# Patient Record
Sex: MALE | Race: WHITE | Employment: OTHER | URBAN - METROPOLITAN AREA
[De-identification: names, ages, dates, MRNs, and addresses within clinical notes are randomized per-mention and may not be internally consistent; named-entity substitution may affect disease eponyms.]

---

## 2017-01-02 ENCOUNTER — CARDPULM VISIT (OUTPATIENT)
Dept: CARDIAC REHAB | Facility: HOSPITAL | Age: 52
End: 2017-01-02
Attending: INTERNAL MEDICINE
Payer: COMMERCIAL

## 2017-01-02 PROCEDURE — 93798 PHYS/QHP OP CAR RHAB W/ECG: CPT

## 2017-01-04 ENCOUNTER — CARDPULM VISIT (OUTPATIENT)
Dept: CARDIAC REHAB | Facility: HOSPITAL | Age: 52
End: 2017-01-04
Attending: INTERNAL MEDICINE
Payer: COMMERCIAL

## 2017-01-04 PROCEDURE — 93798 PHYS/QHP OP CAR RHAB W/ECG: CPT

## 2017-01-06 ENCOUNTER — APPOINTMENT (OUTPATIENT)
Dept: CARDIAC REHAB | Facility: HOSPITAL | Age: 52
End: 2017-01-06
Attending: INTERNAL MEDICINE
Payer: COMMERCIAL

## 2017-01-06 PROCEDURE — 93798 PHYS/QHP OP CAR RHAB W/ECG: CPT

## 2017-01-09 ENCOUNTER — APPOINTMENT (OUTPATIENT)
Dept: CARDIAC REHAB | Facility: HOSPITAL | Age: 52
End: 2017-01-09
Attending: INTERNAL MEDICINE
Payer: COMMERCIAL

## 2017-01-11 ENCOUNTER — APPOINTMENT (OUTPATIENT)
Dept: CARDIAC REHAB | Facility: HOSPITAL | Age: 52
End: 2017-01-11
Attending: INTERNAL MEDICINE
Payer: COMMERCIAL

## 2017-01-11 PROCEDURE — 93798 PHYS/QHP OP CAR RHAB W/ECG: CPT

## 2017-01-13 ENCOUNTER — APPOINTMENT (OUTPATIENT)
Dept: CARDIAC REHAB | Facility: HOSPITAL | Age: 52
End: 2017-01-13
Attending: INTERNAL MEDICINE
Payer: COMMERCIAL

## 2017-01-13 PROCEDURE — 93798 PHYS/QHP OP CAR RHAB W/ECG: CPT

## 2017-01-16 ENCOUNTER — CARDPULM VISIT (OUTPATIENT)
Dept: CARDIAC REHAB | Facility: HOSPITAL | Age: 52
End: 2017-01-16
Attending: INTERNAL MEDICINE
Payer: COMMERCIAL

## 2017-01-16 PROCEDURE — 93798 PHYS/QHP OP CAR RHAB W/ECG: CPT

## 2017-01-18 ENCOUNTER — CARDPULM VISIT (OUTPATIENT)
Dept: CARDIAC REHAB | Facility: HOSPITAL | Age: 52
End: 2017-01-18
Attending: INTERNAL MEDICINE
Payer: COMMERCIAL

## 2017-01-18 PROCEDURE — 93798 PHYS/QHP OP CAR RHAB W/ECG: CPT

## 2017-01-20 ENCOUNTER — APPOINTMENT (OUTPATIENT)
Dept: CARDIAC REHAB | Facility: HOSPITAL | Age: 52
End: 2017-01-20
Attending: INTERNAL MEDICINE
Payer: COMMERCIAL

## 2017-01-23 ENCOUNTER — APPOINTMENT (OUTPATIENT)
Dept: CARDIAC REHAB | Facility: HOSPITAL | Age: 52
End: 2017-01-23
Attending: INTERNAL MEDICINE
Payer: COMMERCIAL

## 2017-01-25 ENCOUNTER — APPOINTMENT (OUTPATIENT)
Dept: CARDIAC REHAB | Facility: HOSPITAL | Age: 52
End: 2017-01-25
Attending: INTERNAL MEDICINE
Payer: COMMERCIAL

## 2017-01-27 ENCOUNTER — APPOINTMENT (OUTPATIENT)
Dept: CARDIAC REHAB | Facility: HOSPITAL | Age: 52
End: 2017-01-27
Attending: INTERNAL MEDICINE
Payer: COMMERCIAL

## 2017-01-30 ENCOUNTER — CARDPULM VISIT (OUTPATIENT)
Dept: CARDIAC REHAB | Facility: HOSPITAL | Age: 52
End: 2017-01-30
Attending: INTERNAL MEDICINE
Payer: COMMERCIAL

## 2017-01-30 PROCEDURE — 93798 PHYS/QHP OP CAR RHAB W/ECG: CPT

## 2017-02-01 ENCOUNTER — CARDPULM VISIT (OUTPATIENT)
Dept: CARDIAC REHAB | Facility: HOSPITAL | Age: 52
End: 2017-02-01
Attending: INTERNAL MEDICINE
Payer: COMMERCIAL

## 2017-02-01 PROCEDURE — 93798 PHYS/QHP OP CAR RHAB W/ECG: CPT

## 2017-02-03 ENCOUNTER — APPOINTMENT (OUTPATIENT)
Dept: CARDIAC REHAB | Facility: HOSPITAL | Age: 52
End: 2017-02-03
Attending: INTERNAL MEDICINE
Payer: COMMERCIAL

## 2017-02-03 PROCEDURE — 93798 PHYS/QHP OP CAR RHAB W/ECG: CPT

## 2017-02-06 ENCOUNTER — APPOINTMENT (OUTPATIENT)
Dept: CARDIAC REHAB | Facility: HOSPITAL | Age: 52
End: 2017-02-06
Attending: INTERNAL MEDICINE
Payer: COMMERCIAL

## 2017-02-08 ENCOUNTER — APPOINTMENT (OUTPATIENT)
Dept: CARDIAC REHAB | Facility: HOSPITAL | Age: 52
End: 2017-02-08
Attending: INTERNAL MEDICINE
Payer: COMMERCIAL

## 2017-02-10 ENCOUNTER — APPOINTMENT (OUTPATIENT)
Dept: CARDIAC REHAB | Facility: HOSPITAL | Age: 52
End: 2017-02-10
Attending: INTERNAL MEDICINE
Payer: COMMERCIAL

## 2017-02-13 ENCOUNTER — CARDPULM VISIT (OUTPATIENT)
Dept: CARDIAC REHAB | Facility: HOSPITAL | Age: 52
End: 2017-02-13
Attending: INTERNAL MEDICINE
Payer: COMMERCIAL

## 2017-02-13 PROCEDURE — 93798 PHYS/QHP OP CAR RHAB W/ECG: CPT

## 2017-02-14 ENCOUNTER — LAB ENCOUNTER (OUTPATIENT)
Dept: LAB | Facility: HOSPITAL | Age: 52
End: 2017-02-14
Attending: INTERNAL MEDICINE
Payer: COMMERCIAL

## 2017-02-14 ENCOUNTER — HOSPITAL ENCOUNTER (OUTPATIENT)
Dept: GENERAL RADIOLOGY | Facility: HOSPITAL | Age: 52
Discharge: HOME | End: 2017-02-14
Attending: INTERNAL MEDICINE
Payer: COMMERCIAL

## 2017-02-14 DIAGNOSIS — R05.9 COUGH: Primary | ICD-10-CM

## 2017-02-14 DIAGNOSIS — J18.9 PNEUMONIA: ICD-10-CM

## 2017-02-14 DIAGNOSIS — Z87.09 HISTORY OF INFLUENZA: ICD-10-CM

## 2017-02-14 PROCEDURE — 87798 DETECT AGENT NOS DNA AMP: CPT | Performed by: INTERNAL MEDICINE

## 2017-02-14 PROCEDURE — 87502 INFLUENZA DNA AMP PROBE: CPT | Performed by: INTERNAL MEDICINE

## 2017-02-14 PROCEDURE — 71020 XR CHEST PA + LAT CHEST (CPT=71020): CPT

## 2017-02-15 ENCOUNTER — CARDPULM VISIT (OUTPATIENT)
Dept: CARDIAC REHAB | Facility: HOSPITAL | Age: 52
End: 2017-02-15
Attending: INTERNAL MEDICINE
Payer: COMMERCIAL

## 2017-02-15 PROCEDURE — 93798 PHYS/QHP OP CAR RHAB W/ECG: CPT

## 2017-02-17 ENCOUNTER — APPOINTMENT (OUTPATIENT)
Dept: CARDIAC REHAB | Facility: HOSPITAL | Age: 52
End: 2017-02-17
Attending: INTERNAL MEDICINE
Payer: COMMERCIAL

## 2017-02-17 PROCEDURE — 93798 PHYS/QHP OP CAR RHAB W/ECG: CPT

## 2017-02-20 ENCOUNTER — CARDPULM VISIT (OUTPATIENT)
Dept: CARDIAC REHAB | Facility: HOSPITAL | Age: 52
End: 2017-02-20
Attending: INTERNAL MEDICINE
Payer: COMMERCIAL

## 2017-02-20 PROCEDURE — 93798 PHYS/QHP OP CAR RHAB W/ECG: CPT

## 2017-02-22 ENCOUNTER — CARDPULM VISIT (OUTPATIENT)
Dept: CARDIAC REHAB | Facility: HOSPITAL | Age: 52
End: 2017-02-22
Attending: INTERNAL MEDICINE
Payer: COMMERCIAL

## 2017-02-22 PROCEDURE — 93798 PHYS/QHP OP CAR RHAB W/ECG: CPT

## 2017-02-24 ENCOUNTER — CARDPULM VISIT (OUTPATIENT)
Dept: CARDIAC REHAB | Facility: HOSPITAL | Age: 52
End: 2017-02-24
Attending: INTERNAL MEDICINE
Payer: COMMERCIAL

## 2017-02-24 PROCEDURE — 93798 PHYS/QHP OP CAR RHAB W/ECG: CPT

## 2017-02-27 ENCOUNTER — CARDPULM VISIT (OUTPATIENT)
Dept: CARDIAC REHAB | Facility: HOSPITAL | Age: 52
End: 2017-02-27
Attending: INTERNAL MEDICINE
Payer: COMMERCIAL

## 2017-02-27 PROCEDURE — 93798 PHYS/QHP OP CAR RHAB W/ECG: CPT

## 2017-03-01 ENCOUNTER — CARDPULM VISIT (OUTPATIENT)
Dept: CARDIAC REHAB | Facility: HOSPITAL | Age: 52
End: 2017-03-01
Attending: INTERNAL MEDICINE
Payer: COMMERCIAL

## 2017-03-01 PROCEDURE — 93798 PHYS/QHP OP CAR RHAB W/ECG: CPT

## 2017-03-03 ENCOUNTER — CARDPULM VISIT (OUTPATIENT)
Dept: CARDIAC REHAB | Facility: HOSPITAL | Age: 52
End: 2017-03-03
Attending: INTERNAL MEDICINE
Payer: COMMERCIAL

## 2017-03-03 PROCEDURE — 93798 PHYS/QHP OP CAR RHAB W/ECG: CPT

## 2017-05-19 ENCOUNTER — HOSPITAL ENCOUNTER (OUTPATIENT)
Dept: CV DIAGNOSTICS | Facility: HOSPITAL | Age: 52
Discharge: HOME | End: 2017-05-19
Attending: INTERNAL MEDICINE
Payer: COMMERCIAL

## 2017-05-19 ENCOUNTER — APPOINTMENT (OUTPATIENT)
Dept: CV DIAGNOSTICS | Facility: HOSPITAL | Age: 52
End: 2017-05-19
Attending: EMERGENCY MEDICINE
Payer: COMMERCIAL

## 2017-05-19 ENCOUNTER — APPOINTMENT (OUTPATIENT)
Dept: GENERAL RADIOLOGY | Facility: HOSPITAL | Age: 52
End: 2017-05-19
Payer: COMMERCIAL

## 2017-05-19 ENCOUNTER — HOSPITAL ENCOUNTER (OUTPATIENT)
Dept: LAB | Facility: HOSPITAL | Age: 52
Discharge: HOME | End: 2017-05-19
Attending: INTERNAL MEDICINE
Payer: COMMERCIAL

## 2017-05-19 ENCOUNTER — HOSPITAL ENCOUNTER (EMERGENCY)
Facility: HOSPITAL | Age: 52
Discharge: HOME | End: 2017-05-19
Attending: EMERGENCY MEDICINE
Payer: COMMERCIAL

## 2017-05-19 VITALS
DIASTOLIC BLOOD PRESSURE: 84 MMHG | SYSTOLIC BLOOD PRESSURE: 151 MMHG | HEART RATE: 78 BPM | BODY MASS INDEX: 29.35 KG/M2 | RESPIRATION RATE: 18 BRPM | OXYGEN SATURATION: 96 % | HEIGHT: 67 IN | TEMPERATURE: 97 F | WEIGHT: 187 LBS

## 2017-05-19 DIAGNOSIS — R07.89 CHEST PAIN, ATYPICAL: Primary | ICD-10-CM

## 2017-05-19 PROCEDURE — 93018 CV STRESS TEST I&R ONLY: CPT | Performed by: EMERGENCY MEDICINE

## 2017-05-19 PROCEDURE — 84484 ASSAY OF TROPONIN QUANT: CPT

## 2017-05-19 PROCEDURE — 84484 ASSAY OF TROPONIN QUANT: CPT | Performed by: INTERNAL MEDICINE

## 2017-05-19 PROCEDURE — 71020 XR CHEST PA + LAT CHEST (CPT=71020): CPT

## 2017-05-19 PROCEDURE — 99285 EMERGENCY DEPT VISIT HI MDM: CPT

## 2017-05-19 PROCEDURE — 93010 ELECTROCARDIOGRAM REPORT: CPT

## 2017-05-19 PROCEDURE — 36415 COLL VENOUS BLD VENIPUNCTURE: CPT

## 2017-05-19 PROCEDURE — 85025 COMPLETE CBC W/AUTO DIFF WBC: CPT

## 2017-05-19 PROCEDURE — 36415 COLL VENOUS BLD VENIPUNCTURE: CPT | Performed by: INTERNAL MEDICINE

## 2017-05-19 PROCEDURE — 93017 CV STRESS TEST TRACING ONLY: CPT | Performed by: EMERGENCY MEDICINE

## 2017-05-19 PROCEDURE — 93350 STRESS TTE ONLY: CPT | Performed by: EMERGENCY MEDICINE

## 2017-05-19 PROCEDURE — 93005 ELECTROCARDIOGRAM TRACING: CPT

## 2017-05-19 PROCEDURE — 80053 COMPREHEN METABOLIC PANEL: CPT

## 2017-05-19 PROCEDURE — 93010 ELECTROCARDIOGRAM REPORT: CPT | Performed by: INTERNAL MEDICINE

## 2017-05-19 NOTE — ED NOTES
Pt to go for stress echo. Pt aware. They will be coming to get pt shortly. Pt awake and alert, appears comfortable and in no distress at this time.

## 2017-05-19 NOTE — ED INITIAL ASSESSMENT (HPI)
Pain to the middle of the chest yesterday. Patient said pain lasted for a few hours and then went away. Patient described pressure as \"sharp\" then. Today, patient said he still has some of the same \"sharp\" pain, but better.  Sent from his doctor's offic

## 2017-05-19 NOTE — ED PROVIDER NOTES
Patient Seen in: [facility] Emergency Department    History   Patient presents with:  Chest Pain Angina (cardiovascular)    Stated Complaint: chest pain    HPI    57-year-old male who has a history of coronary disease and has had a stent placed in O mouth 2 (two) times daily. nitroGLYCERIN 0.4 MG Sublingual SL Tab,  Place 1 tablet (0.4 mg total) under the tongue every 5 (five) minutes as needed for Chest pain. Zolpidem Tartrate 10 MG Oral Tab,  Take 10 mg by mouth nightly as needed.      aspirin 32 cyanosis or edema noted.   Full range of motion noted without tenderness  Neuro: No focal deficits noted    ED Course     Labs Reviewed   COMP METABOLIC PANEL (14) - Abnormal; Notable for the following:     Glucose 103 (*)     AST 83 (*)     All other compo within normal limits. No pleural effusions. No pneumothorax. Mild hypertrophic spurring of the thoracic spine.           The patient underwent an stress echocardiogram which is read as normal per Dr. Mirela Rivera.   Patient states is feeling better while he

## 2017-06-16 PROBLEM — E78.00 HYPERCHOLESTEROLEMIA: Status: ACTIVE | Noted: 2017-06-16

## 2017-06-16 PROBLEM — I25.10 CORONARY ARTERY DISEASE INVOLVING NATIVE CORONARY ARTERY OF NATIVE HEART WITHOUT ANGINA PECTORIS: Status: ACTIVE | Noted: 2017-06-16

## 2017-10-30 ENCOUNTER — APPOINTMENT (OUTPATIENT)
Dept: GENERAL RADIOLOGY | Facility: HOSPITAL | Age: 52
End: 2017-10-30
Attending: EMERGENCY MEDICINE
Payer: COMMERCIAL

## 2017-10-30 ENCOUNTER — HOSPITAL ENCOUNTER (EMERGENCY)
Facility: HOSPITAL | Age: 52
Discharge: HOME | End: 2017-10-30
Attending: EMERGENCY MEDICINE
Payer: COMMERCIAL

## 2017-10-30 VITALS
DIASTOLIC BLOOD PRESSURE: 91 MMHG | BODY MASS INDEX: 30.29 KG/M2 | SYSTOLIC BLOOD PRESSURE: 154 MMHG | HEIGHT: 67 IN | OXYGEN SATURATION: 96 % | WEIGHT: 193 LBS | HEART RATE: 58 BPM | RESPIRATION RATE: 16 BRPM | TEMPERATURE: 98 F

## 2017-10-30 DIAGNOSIS — I10 HYPERTENSION, UNSPECIFIED TYPE: ICD-10-CM

## 2017-10-30 DIAGNOSIS — R07.89 CHEST PAIN, ATYPICAL: Primary | ICD-10-CM

## 2017-10-30 PROCEDURE — 71010 XR CHEST AP PORTABLE  (CPT=71010): CPT | Performed by: EMERGENCY MEDICINE

## 2017-10-30 PROCEDURE — 85025 COMPLETE CBC W/AUTO DIFF WBC: CPT | Performed by: EMERGENCY MEDICINE

## 2017-10-30 PROCEDURE — 99285 EMERGENCY DEPT VISIT HI MDM: CPT

## 2017-10-30 PROCEDURE — 93005 ELECTROCARDIOGRAM TRACING: CPT

## 2017-10-30 PROCEDURE — 36415 COLL VENOUS BLD VENIPUNCTURE: CPT

## 2017-10-30 PROCEDURE — 85730 THROMBOPLASTIN TIME PARTIAL: CPT | Performed by: EMERGENCY MEDICINE

## 2017-10-30 PROCEDURE — 85610 PROTHROMBIN TIME: CPT | Performed by: EMERGENCY MEDICINE

## 2017-10-30 PROCEDURE — 83880 ASSAY OF NATRIURETIC PEPTIDE: CPT | Performed by: EMERGENCY MEDICINE

## 2017-10-30 PROCEDURE — 93010 ELECTROCARDIOGRAM REPORT: CPT

## 2017-10-30 PROCEDURE — 84484 ASSAY OF TROPONIN QUANT: CPT | Performed by: EMERGENCY MEDICINE

## 2017-10-30 PROCEDURE — 80053 COMPREHEN METABOLIC PANEL: CPT | Performed by: EMERGENCY MEDICINE

## 2017-10-30 RX ORDER — ASPIRIN 81 MG/1
324 TABLET, CHEWABLE ORAL ONCE
Status: DISCONTINUED | OUTPATIENT
Start: 2017-10-30 | End: 2017-10-30

## 2017-10-30 RX ORDER — BENAZEPRIL HYDROCHLORIDE 20 MG/1
20 TABLET ORAL DAILY
Qty: 30 TABLET | Refills: 0 | Status: SHIPPED | OUTPATIENT
Start: 2017-10-30 | End: 2017-11-29

## 2017-10-30 RX ORDER — LISINOPRIL 10 MG/1
10 TABLET ORAL ONCE
Status: DISCONTINUED | OUTPATIENT
Start: 2017-10-30 | End: 2017-10-30

## 2017-10-30 NOTE — ED INITIAL ASSESSMENT (HPI)
Pt states cp last noc with some hyn, had stents placed last year, saw pcp today and was sent to er for further work up

## 2017-10-30 NOTE — ED PROVIDER NOTES
Patient Seen in: [facility] Emergency Department    History   Patient presents with:  Chest Pain Angina (cardiovascular)    Stated Complaint: cp for awhile had stents last year, no sob    HPI    28-year-old male presents emergency room for evaluatio Katie  2008: OTHER SURGICAL HISTORY      Comment: kidney stone removed  No date: TONSILLECTOMY    No family history on file.     Smoking status: Former Smoker                                                              Packs/day: 0.00      Years: 0.0 tenderness  EXTREMITIES: No peripheral edema, no calf tenderness, dorsal pedal pulses present and equal bilaterally. SKIN: Warm, dry, intact, no rashes.   NEUROLOGIC EXAM: Tongue midline, no facial drooping, no ptosis, muscle strength +5/5 bilateral upper Marah Cordova, patient blood pressure is elevated and was recommended by cardiologist to increase Lotensin to 20 mg daily.   Patient's chest pain is atypical, has been present over 6 months and is not exertional, patient did have stress test for the same discom

## 2017-12-11 PROBLEM — I10 ESSENTIAL HYPERTENSION: Status: ACTIVE | Noted: 2017-12-11

## 2017-12-11 PROBLEM — R73.02 GLUCOSE INTOLERANCE (IMPAIRED GLUCOSE TOLERANCE): Status: ACTIVE | Noted: 2017-12-11

## 2019-05-29 ENCOUNTER — LAB ENCOUNTER (OUTPATIENT)
Dept: LAB | Facility: HOSPITAL | Age: 54
End: 2019-05-29
Attending: INTERNAL MEDICINE
Payer: COMMERCIAL

## 2019-05-29 DIAGNOSIS — E11.65 TYPE II DIABETES MELLITUS, UNCONTROLLED (HCC): Primary | ICD-10-CM

## 2019-05-29 DIAGNOSIS — Z00.00 ROUTINE GENERAL MEDICAL EXAMINATION AT A HEALTH CARE FACILITY: ICD-10-CM

## 2019-05-29 PROCEDURE — 85025 COMPLETE CBC W/AUTO DIFF WBC: CPT

## 2019-05-29 PROCEDURE — 83036 HEMOGLOBIN GLYCOSYLATED A1C: CPT

## 2019-05-29 PROCEDURE — 80053 COMPREHEN METABOLIC PANEL: CPT

## 2019-05-29 PROCEDURE — 36415 COLL VENOUS BLD VENIPUNCTURE: CPT

## 2019-05-29 PROCEDURE — 80061 LIPID PANEL: CPT

## 2019-05-29 PROCEDURE — 84443 ASSAY THYROID STIM HORMONE: CPT

## 2019-05-29 PROCEDURE — 84439 ASSAY OF FREE THYROXINE: CPT

## 2019-06-21 ENCOUNTER — LAB ENCOUNTER (OUTPATIENT)
Dept: LAB | Facility: HOSPITAL | Age: 54
End: 2019-06-21
Attending: INTERNAL MEDICINE
Payer: COMMERCIAL

## 2019-06-21 DIAGNOSIS — D72.819 LEUKOPENIA: Primary | ICD-10-CM

## 2019-06-21 PROCEDURE — 82746 ASSAY OF FOLIC ACID SERUM: CPT

## 2019-06-21 PROCEDURE — 82607 VITAMIN B-12: CPT

## 2019-06-21 PROCEDURE — 36415 COLL VENOUS BLD VENIPUNCTURE: CPT

## 2019-09-16 ENCOUNTER — LAB ENCOUNTER (OUTPATIENT)
Dept: LAB | Facility: HOSPITAL | Age: 54
End: 2019-09-16
Attending: INTERNAL MEDICINE
Payer: COMMERCIAL

## 2019-09-16 DIAGNOSIS — E55.9 VITAMIN D DEFICIENCY: Primary | ICD-10-CM

## 2019-09-16 LAB
FOLATE SERPL-MCNC: 16.2 NG/ML (ref 8.7–?)
VIT B12 SERPL-MCNC: 249 PG/ML (ref 193–986)

## 2019-09-16 PROCEDURE — 82607 VITAMIN B-12: CPT

## 2019-09-16 PROCEDURE — 82746 ASSAY OF FOLIC ACID SERUM: CPT

## 2019-09-16 PROCEDURE — 36415 COLL VENOUS BLD VENIPUNCTURE: CPT

## 2019-12-09 PROBLEM — M54.16 LUMBAR RADICULOPATHY: Status: ACTIVE | Noted: 2019-12-09
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
## 2019-12-19 PROBLEM — M51.26 HNP (HERNIATED NUCLEUS PULPOSUS), LUMBAR: Status: ACTIVE | Noted: 2019-12-19

## 2019-12-19 PROBLEM — M54.16 LEFT LUMBAR RADICULITIS: Status: ACTIVE | Noted: 2019-12-19

## 2019-12-26 ENCOUNTER — EKG ENCOUNTER (OUTPATIENT)
Dept: LAB | Facility: HOSPITAL | Age: 54
End: 2019-12-26
Attending: INTERNAL MEDICINE
Payer: COMMERCIAL

## 2019-12-26 ENCOUNTER — HOSPITAL ENCOUNTER (OUTPATIENT)
Dept: GENERAL RADIOLOGY | Facility: HOSPITAL | Age: 54
Discharge: HOME | End: 2019-12-26
Attending: INTERNAL MEDICINE
Payer: COMMERCIAL

## 2019-12-26 DIAGNOSIS — Z01.818 PRE-OP EXAM: ICD-10-CM

## 2019-12-26 DIAGNOSIS — Z01.818 PRE-OP TESTING: Primary | ICD-10-CM

## 2019-12-26 PROCEDURE — 71046 X-RAY EXAM CHEST 2 VIEWS: CPT | Performed by: INTERNAL MEDICINE

## 2019-12-26 PROCEDURE — 81003 URINALYSIS AUTO W/O SCOPE: CPT

## 2019-12-26 PROCEDURE — 85730 THROMBOPLASTIN TIME PARTIAL: CPT

## 2019-12-26 PROCEDURE — 80053 COMPREHEN METABOLIC PANEL: CPT

## 2019-12-26 PROCEDURE — 85025 COMPLETE CBC W/AUTO DIFF WBC: CPT

## 2019-12-26 PROCEDURE — 36415 COLL VENOUS BLD VENIPUNCTURE: CPT

## 2019-12-26 PROCEDURE — 93010 ELECTROCARDIOGRAM REPORT: CPT | Performed by: INTERNAL MEDICINE

## 2019-12-26 PROCEDURE — 87081 CULTURE SCREEN ONLY: CPT

## 2019-12-26 PROCEDURE — 93005 ELECTROCARDIOGRAM TRACING: CPT

## 2019-12-26 PROCEDURE — 85610 PROTHROMBIN TIME: CPT

## 2019-12-30 ENCOUNTER — LAB ENCOUNTER (OUTPATIENT)
Dept: LAB | Facility: HOSPITAL | Age: 54
End: 2019-12-30
Attending: INTERNAL MEDICINE
Payer: COMMERCIAL

## 2019-12-30 DIAGNOSIS — E53.8 LOW FOLATE: ICD-10-CM

## 2019-12-30 DIAGNOSIS — E53.8 B12 DEFICIENCY: Primary | ICD-10-CM

## 2020-01-07 ENCOUNTER — ANESTHESIA EVENT (OUTPATIENT)
Dept: SURGERY | Facility: HOSPITAL | Age: 55
End: 2020-01-07
Payer: COMMERCIAL

## 2020-01-07 NOTE — ANESTHESIA PREPROCEDURE EVALUATION
PRE-OP EVALUATION    Patient Name: Wilbert Blanc    Pre-op Diagnosis: Lumbar disc herniation [M51.26]    Procedure(s):  Lumbar 4- Lumbar 5 Microdiscectomy     Surgeon(s) and Role:     Mary Pearce MD - Primary    Pre-op vitals reviewed.         Body ma Endo/Other      (+) diabetes  type 2, not using insulin                         Pulmonary    Negative pulmonary ROS.                        Neuro/Psych                                    Past Surgical History:   Procedure Laterality Date findings            ASA: 3   Plan: general  NPO status verified and     Post-procedure pain management plan discussed with surgeon and patient.     Comment: Discussed risks and benefits of general anesthesia including malignant hyperthermia as well as injur

## 2020-01-08 ENCOUNTER — APPOINTMENT (OUTPATIENT)
Dept: GENERAL RADIOLOGY | Facility: HOSPITAL | Age: 55
End: 2020-01-08
Attending: ORTHOPAEDIC SURGERY
Payer: COMMERCIAL

## 2020-01-08 ENCOUNTER — HOSPITAL ENCOUNTER (OUTPATIENT)
Facility: HOSPITAL | Age: 55
Setting detail: HOSPITAL OUTPATIENT SURGERY
Discharge: HOME | End: 2020-01-08
Attending: ORTHOPAEDIC SURGERY | Admitting: ORTHOPAEDIC SURGERY
Payer: COMMERCIAL

## 2020-01-08 ENCOUNTER — ANESTHESIA (OUTPATIENT)
Dept: SURGERY | Facility: HOSPITAL | Age: 55
End: 2020-01-08
Payer: COMMERCIAL

## 2020-01-08 VITALS
HEIGHT: 67 IN | BODY MASS INDEX: 26.37 KG/M2 | HEART RATE: 95 BPM | SYSTOLIC BLOOD PRESSURE: 144 MMHG | WEIGHT: 168 LBS | TEMPERATURE: 98 F | RESPIRATION RATE: 16 BRPM | OXYGEN SATURATION: 93 % | DIASTOLIC BLOOD PRESSURE: 96 MMHG

## 2020-01-08 DIAGNOSIS — M51.26 LUMBAR DISC HERNIATION: ICD-10-CM

## 2020-01-08 LAB
GLUCOSE BLD-MCNC: 136 MG/DL (ref 70–99)
GLUCOSE BLD-MCNC: 161 MG/DL (ref 70–99)

## 2020-01-08 PROCEDURE — 76000 FLUOROSCOPY <1 HR PHYS/QHP: CPT | Performed by: ORTHOPAEDIC SURGERY

## 2020-01-08 PROCEDURE — 82962 GLUCOSE BLOOD TEST: CPT

## 2020-01-08 PROCEDURE — 0SB20ZZ EXCISION OF LUMBAR VERTEBRAL DISC, OPEN APPROACH: ICD-10-PCS | Performed by: ORTHOPAEDIC SURGERY

## 2020-01-08 RX ORDER — SODIUM CHLORIDE, SODIUM LACTATE, POTASSIUM CHLORIDE, CALCIUM CHLORIDE 600; 310; 30; 20 MG/100ML; MG/100ML; MG/100ML; MG/100ML
INJECTION, SOLUTION INTRAVENOUS CONTINUOUS
Status: DISCONTINUED | OUTPATIENT
Start: 2020-01-08 | End: 2020-01-08

## 2020-01-08 RX ORDER — HYDROCODONE BITARTRATE AND ACETAMINOPHEN 5; 325 MG/1; MG/1
1 TABLET ORAL EVERY 6 HOURS PRN
Qty: 30 TABLET | Refills: 0 | Status: SHIPPED | OUTPATIENT
Start: 2020-01-08 | End: 2020-01-16

## 2020-01-08 RX ORDER — DEXTROSE MONOHYDRATE 25 G/50ML
50 INJECTION, SOLUTION INTRAVENOUS
Status: DISCONTINUED | OUTPATIENT
Start: 2020-01-08 | End: 2020-01-08 | Stop reason: HOSPADM

## 2020-01-08 RX ORDER — ONDANSETRON 2 MG/ML
INJECTION INTRAMUSCULAR; INTRAVENOUS AS NEEDED
Status: DISCONTINUED | OUTPATIENT
Start: 2020-01-08 | End: 2020-01-08 | Stop reason: SURG

## 2020-01-08 RX ORDER — ACETAMINOPHEN 500 MG
1000 TABLET ORAL ONCE
Status: DISCONTINUED | OUTPATIENT
Start: 2020-01-08 | End: 2020-01-08 | Stop reason: HOSPADM

## 2020-01-08 RX ORDER — LIDOCAINE HYDROCHLORIDE 10 MG/ML
INJECTION, SOLUTION EPIDURAL; INFILTRATION; INTRACAUDAL; PERINEURAL AS NEEDED
Status: DISCONTINUED | OUTPATIENT
Start: 2020-01-08 | End: 2020-01-08 | Stop reason: SURG

## 2020-01-08 RX ORDER — ONDANSETRON 2 MG/ML
4 INJECTION INTRAMUSCULAR; INTRAVENOUS AS NEEDED
Status: DISCONTINUED | OUTPATIENT
Start: 2020-01-08 | End: 2020-01-08

## 2020-01-08 RX ORDER — HYDROCODONE BITARTRATE AND ACETAMINOPHEN 5; 325 MG/1; MG/1
1 TABLET ORAL AS NEEDED
Status: COMPLETED | OUTPATIENT
Start: 2020-01-08 | End: 2020-01-08

## 2020-01-08 RX ORDER — LABETALOL HYDROCHLORIDE 5 MG/ML
5 INJECTION, SOLUTION INTRAVENOUS EVERY 5 MIN PRN
Status: DISCONTINUED | OUTPATIENT
Start: 2020-01-08 | End: 2020-01-08

## 2020-01-08 RX ORDER — HYDROMORPHONE HYDROCHLORIDE 1 MG/ML
0.4 INJECTION, SOLUTION INTRAMUSCULAR; INTRAVENOUS; SUBCUTANEOUS EVERY 5 MIN PRN
Status: DISCONTINUED | OUTPATIENT
Start: 2020-01-08 | End: 2020-01-08

## 2020-01-08 RX ORDER — DEXTROSE MONOHYDRATE 25 G/50ML
50 INJECTION, SOLUTION INTRAVENOUS
Status: DISCONTINUED | OUTPATIENT
Start: 2020-01-08 | End: 2020-01-08

## 2020-01-08 RX ORDER — DEXAMETHASONE SODIUM PHOSPHATE 4 MG/ML
VIAL (ML) INJECTION AS NEEDED
Status: DISCONTINUED | OUTPATIENT
Start: 2020-01-08 | End: 2020-01-08 | Stop reason: SURG

## 2020-01-08 RX ORDER — METHYLPREDNISOLONE ACETATE 40 MG/ML
INJECTION, SUSPENSION INTRA-ARTICULAR; INTRALESIONAL; INTRAMUSCULAR; SOFT TISSUE AS NEEDED
Status: DISCONTINUED | OUTPATIENT
Start: 2020-01-08 | End: 2020-01-08 | Stop reason: HOSPADM

## 2020-01-08 RX ORDER — VANCOMYCIN HYDROCHLORIDE 1 G/20ML
INJECTION, POWDER, LYOPHILIZED, FOR SOLUTION INTRAVENOUS AS NEEDED
Status: DISCONTINUED | OUTPATIENT
Start: 2020-01-08 | End: 2020-01-08 | Stop reason: HOSPADM

## 2020-01-08 RX ORDER — HYDROCODONE BITARTRATE AND ACETAMINOPHEN 5; 325 MG/1; MG/1
2 TABLET ORAL AS NEEDED
Status: COMPLETED | OUTPATIENT
Start: 2020-01-08 | End: 2020-01-08

## 2020-01-08 RX ORDER — METOCLOPRAMIDE HYDROCHLORIDE 5 MG/ML
INJECTION INTRAMUSCULAR; INTRAVENOUS AS NEEDED
Status: DISCONTINUED | OUTPATIENT
Start: 2020-01-08 | End: 2020-01-08 | Stop reason: SURG

## 2020-01-08 RX ORDER — MEPERIDINE HYDROCHLORIDE 25 MG/ML
12.5 INJECTION INTRAMUSCULAR; INTRAVENOUS; SUBCUTANEOUS AS NEEDED
Status: DISCONTINUED | OUTPATIENT
Start: 2020-01-08 | End: 2020-01-08

## 2020-01-08 RX ORDER — NALOXONE HYDROCHLORIDE 0.4 MG/ML
80 INJECTION, SOLUTION INTRAMUSCULAR; INTRAVENOUS; SUBCUTANEOUS AS NEEDED
Status: DISCONTINUED | OUTPATIENT
Start: 2020-01-08 | End: 2020-01-08

## 2020-01-08 RX ORDER — GLYCOPYRROLATE 0.2 MG/ML
INJECTION, SOLUTION INTRAMUSCULAR; INTRAVENOUS AS NEEDED
Status: DISCONTINUED | OUTPATIENT
Start: 2020-01-08 | End: 2020-01-08 | Stop reason: SURG

## 2020-01-08 RX ORDER — LABETALOL HYDROCHLORIDE 5 MG/ML
INJECTION, SOLUTION INTRAVENOUS
Status: DISCONTINUED
Start: 2020-01-08 | End: 2020-01-08

## 2020-01-08 RX ORDER — ROCURONIUM BROMIDE 10 MG/ML
INJECTION, SOLUTION INTRAVENOUS AS NEEDED
Status: DISCONTINUED | OUTPATIENT
Start: 2020-01-08 | End: 2020-01-08 | Stop reason: SURG

## 2020-01-08 RX ORDER — LABETALOL HYDROCHLORIDE 5 MG/ML
5 INJECTION, SOLUTION INTRAVENOUS ONCE
Status: COMPLETED | OUTPATIENT
Start: 2020-01-08 | End: 2020-01-08

## 2020-01-08 RX ORDER — MIDAZOLAM HYDROCHLORIDE 1 MG/ML
1 INJECTION INTRAMUSCULAR; INTRAVENOUS EVERY 5 MIN PRN
Status: DISCONTINUED | OUTPATIENT
Start: 2020-01-08 | End: 2020-01-08

## 2020-01-08 RX ORDER — NEOSTIGMINE METHYLSULFATE 1 MG/ML
INJECTION INTRAVENOUS AS NEEDED
Status: DISCONTINUED | OUTPATIENT
Start: 2020-01-08 | End: 2020-01-08 | Stop reason: SURG

## 2020-01-08 RX ORDER — INSULIN ASPART 100 [IU]/ML
INJECTION, SOLUTION INTRAVENOUS; SUBCUTANEOUS ONCE
Status: DISCONTINUED | OUTPATIENT
Start: 2020-01-08 | End: 2020-01-08

## 2020-01-08 RX ORDER — CEFAZOLIN SODIUM/WATER 2 G/20 ML
2 SYRINGE (ML) INTRAVENOUS ONCE
Status: COMPLETED | OUTPATIENT
Start: 2020-01-08 | End: 2020-01-08

## 2020-01-08 RX ORDER — BUPIVACAINE HYDROCHLORIDE 5 MG/ML
INJECTION, SOLUTION EPIDURAL; INTRACAUDAL AS NEEDED
Status: DISCONTINUED | OUTPATIENT
Start: 2020-01-08 | End: 2020-01-08 | Stop reason: HOSPADM

## 2020-01-08 RX ADMIN — GLYCOPYRROLATE 0.4 MG: 0.2 INJECTION, SOLUTION INTRAMUSCULAR; INTRAVENOUS at 08:39:00

## 2020-01-08 RX ADMIN — METOCLOPRAMIDE HYDROCHLORIDE 10 MG: 5 INJECTION INTRAMUSCULAR; INTRAVENOUS at 07:42:00

## 2020-01-08 RX ADMIN — LIDOCAINE HYDROCHLORIDE 50 MG: 10 INJECTION, SOLUTION EPIDURAL; INFILTRATION; INTRACAUDAL; PERINEURAL at 07:37:00

## 2020-01-08 RX ADMIN — NEOSTIGMINE METHYLSULFATE 4 MG: 1 INJECTION INTRAVENOUS at 08:39:00

## 2020-01-08 RX ADMIN — DEXAMETHASONE SODIUM PHOSPHATE 4 MG: 4 MG/ML VIAL (ML) INJECTION at 07:42:00

## 2020-01-08 RX ADMIN — ROCURONIUM BROMIDE 50 MG: 10 INJECTION, SOLUTION INTRAVENOUS at 07:37:00

## 2020-01-08 RX ADMIN — CEFAZOLIN SODIUM/WATER 2 G: 2 G/20 ML SYRINGE (ML) INTRAVENOUS at 07:44:00

## 2020-01-08 RX ADMIN — ONDANSETRON 4 MG: 2 INJECTION INTRAMUSCULAR; INTRAVENOUS at 08:37:00

## 2020-01-08 NOTE — ANESTHESIA PROCEDURE NOTES
Airway  Urgency: elective      General Information and Staff    Patient location during procedure: OR  Anesthesiologist: Destiny Almazan MD  Resident/CRNA: Mathew Villar CRNA  Performed: CRNA     Indications and Patient Condition  Indications for airway manag

## 2020-01-08 NOTE — OPERATIVE REPORT
Operative Report  Patient Name:  Wilbert Blanc  Date: 1/8/2020  Preoperative Diagnosis: L4-05 L disc hernation  Postoperative Diagnosis: Same  Primary Surgeon: Christiano Thakur MD  Assistant: Shannan Potts  Procedures:   L4-5 removal of disc herniation 18-gauge needle was placed in the area of interest and a lateral fluoroscopic image was taken demonstrating appropriate placement for skin incision.   Skin incision was made and electrocautery was used to dissect through the subcutaneous tissue f A physician assistant was necessary during the case to provide retraction and to manage wound suction so that I could operate with two hands under the surgical microscope.      Carrie Puentes MD  Division of Spine Surgery  [facility]

## 2020-01-08 NOTE — BRIEF OP NOTE
Pre-Operative Diagnosis: Lumbar disc herniation [M51.26]     Post-Operative Diagnosis: Lumbar disc herniation [M51.26]      Procedure Performed:   Procedure(s):  Lumbar 4- Lumbar 5 Microdiscectomy     Surgeon(s) and Role:     Marietta Boucher MD - Primary

## 2020-01-08 NOTE — H&P
Patient: Wilfred Lemus  Medical Record Number: [id number]  Referring Physician:  No ref.  provider found  PCP: Carolina Alvares MD      HISTORY OF CHIEF COMPLAINT:    Wilfred Lemus is a 62year old male, who presents today for his scheduled surgery by

## 2020-01-08 NOTE — OR PREOP
B/P 169/104, repeat 159/106 pt did not take his morning or evening BP medications Benazepril 20mg and Metoprolol 25mg. Guadalupe Riedel HR 74  called and informed of this information.  Order received to give labetalol

## 2020-01-08 NOTE — OR PREOP
bp 156/109 hr 90 before labetalol 5mg ivp given bp 116/103 hr 81 post. No further orders from dr Kvng Samayoa

## 2020-01-27 ENCOUNTER — APPOINTMENT (OUTPATIENT)
Dept: LAB | Facility: HOSPITAL | Age: 55
End: 2020-01-27
Attending: INTERNAL MEDICINE
Payer: COMMERCIAL

## 2020-01-27 DIAGNOSIS — E53.8 LOW FOLATE: ICD-10-CM

## 2020-01-27 DIAGNOSIS — E53.8 B12 DEFICIENCY: ICD-10-CM

## 2020-01-27 LAB
FOLATE SERPL-MCNC: 17.9 NG/ML (ref 8.7–?)
VIT B12 SERPL-MCNC: 1319 PG/ML (ref 193–986)

## 2020-01-27 PROCEDURE — 82607 VITAMIN B-12: CPT

## 2020-01-27 PROCEDURE — 82746 ASSAY OF FOLIC ACID SERUM: CPT

## 2020-01-27 PROCEDURE — 36415 COLL VENOUS BLD VENIPUNCTURE: CPT

## 2020-08-28 ENCOUNTER — LAB ENCOUNTER (OUTPATIENT)
Dept: LAB | Age: 55
End: 2020-08-28
Attending: INTERNAL MEDICINE
Payer: COMMERCIAL

## 2020-08-28 DIAGNOSIS — Z00.00 ROUTINE GENERAL MEDICAL EXAMINATION AT A HEALTH CARE FACILITY: Primary | ICD-10-CM

## 2020-08-28 LAB
ALBUMIN SERPL-MCNC: 4 G/DL (ref 3.4–5)
ALBUMIN/GLOB SERPL: 1.1 {RATIO} (ref 1–2)
ALP LIVER SERPL-CCNC: 90 U/L (ref 45–117)
ALT SERPL-CCNC: 90 U/L (ref 16–61)
ANION GAP SERPL CALC-SCNC: 6 MMOL/L (ref 0–18)
AST SERPL-CCNC: 98 U/L (ref 15–37)
BASOPHILS # BLD AUTO: 0.03 X10(3) UL (ref 0–0.2)
BASOPHILS NFR BLD AUTO: 0.6 %
BILIRUB SERPL-MCNC: 0.6 MG/DL (ref 0.1–2)
BUN BLD-MCNC: 19 MG/DL (ref 7–18)
BUN/CREAT SERPL: 22.9 (ref 10–20)
CALCIUM BLD-MCNC: 9.1 MG/DL (ref 8.5–10.1)
CHLORIDE SERPL-SCNC: 105 MMOL/L (ref 98–112)
CO2 SERPL-SCNC: 27 MMOL/L (ref 21–32)
COMPLEXED PSA SERPL-MCNC: 0.27 NG/ML (ref ?–4)
CREAT BLD-MCNC: 0.83 MG/DL (ref 0.7–1.3)
DEPRECATED RDW RBC AUTO: 45.9 FL (ref 35.1–46.3)
EOSINOPHIL # BLD AUTO: 0.16 X10(3) UL (ref 0–0.7)
EOSINOPHIL NFR BLD AUTO: 3.4 %
ERYTHROCYTE [DISTWIDTH] IN BLOOD BY AUTOMATED COUNT: 13.1 % (ref 11–15)
EST. AVERAGE GLUCOSE BLD GHB EST-MCNC: 131 MG/DL (ref 68–126)
GLOBULIN PLAS-MCNC: 3.7 G/DL (ref 2.8–4.4)
GLUCOSE BLD-MCNC: 145 MG/DL (ref 70–99)
HBA1C MFR BLD HPLC: 6.2 % (ref ?–5.7)
HCT VFR BLD AUTO: 46.8 % (ref 39–53)
HGB BLD-MCNC: 15.4 G/DL (ref 13–17.5)
IMM GRANULOCYTES # BLD AUTO: 0.01 X10(3) UL (ref 0–1)
IMM GRANULOCYTES NFR BLD: 0.2 %
LYMPHOCYTES # BLD AUTO: 0.99 X10(3) UL (ref 1–4)
LYMPHOCYTES NFR BLD AUTO: 21.1 %
M PROTEIN MFR SERPL ELPH: 7.7 G/DL (ref 6.4–8.2)
MCH RBC QN AUTO: 31.3 PG (ref 26–34)
MCHC RBC AUTO-ENTMCNC: 32.9 G/DL (ref 31–37)
MCV RBC AUTO: 95.1 FL (ref 80–100)
MONOCYTES # BLD AUTO: 0.51 X10(3) UL (ref 0.1–1)
MONOCYTES NFR BLD AUTO: 10.9 %
NEUTROPHILS # BLD AUTO: 3 X10 (3) UL (ref 1.5–7.7)
NEUTROPHILS # BLD AUTO: 3 X10(3) UL (ref 1.5–7.7)
NEUTROPHILS NFR BLD AUTO: 63.8 %
OSMOLALITY SERPL CALC.SUM OF ELEC: 291 MOSM/KG (ref 275–295)
PATIENT FASTING Y/N/NP: YES
PLATELET # BLD AUTO: 141 10(3)UL (ref 150–450)
POTASSIUM SERPL-SCNC: 4 MMOL/L (ref 3.5–5.1)
RBC # BLD AUTO: 4.92 X10(6)UL (ref 4.3–5.7)
SODIUM SERPL-SCNC: 138 MMOL/L (ref 136–145)
T4 FREE SERPL-MCNC: 0.9 NG/DL (ref 0.8–1.7)
TSI SER-ACNC: 2.52 MIU/ML (ref 0.36–3.74)
WBC # BLD AUTO: 4.7 X10(3) UL (ref 4–11)

## 2020-08-28 PROCEDURE — 85025 COMPLETE CBC W/AUTO DIFF WBC: CPT

## 2020-08-28 PROCEDURE — 83036 HEMOGLOBIN GLYCOSYLATED A1C: CPT

## 2020-08-28 PROCEDURE — 84443 ASSAY THYROID STIM HORMONE: CPT

## 2020-08-28 PROCEDURE — 80053 COMPREHEN METABOLIC PANEL: CPT

## 2020-08-28 PROCEDURE — 84439 ASSAY OF FREE THYROXINE: CPT

## 2020-08-28 PROCEDURE — 36415 COLL VENOUS BLD VENIPUNCTURE: CPT

## 2021-03-20 DIAGNOSIS — Z23 NEED FOR VACCINATION: ICD-10-CM

## 2021-03-21 ENCOUNTER — IMMUNIZATION (OUTPATIENT)
Dept: LAB | Age: 56
End: 2021-03-21
Attending: HOSPITALIST
Payer: COMMERCIAL

## 2021-03-21 DIAGNOSIS — Z23 NEED FOR VACCINATION: Primary | ICD-10-CM

## 2021-03-21 PROCEDURE — 0001A SARSCOV2 VAC 30MCG/0.3ML IM: CPT

## 2021-03-26 ENCOUNTER — HOSPITAL ENCOUNTER (EMERGENCY)
Facility: HOSPITAL | Age: 56
Discharge: HOME | End: 2021-03-26
Attending: EMERGENCY MEDICINE
Payer: COMMERCIAL

## 2021-03-26 ENCOUNTER — APPOINTMENT (OUTPATIENT)
Dept: GENERAL RADIOLOGY | Facility: HOSPITAL | Age: 56
End: 2021-03-26
Payer: COMMERCIAL

## 2021-03-26 VITALS
SYSTOLIC BLOOD PRESSURE: 124 MMHG | TEMPERATURE: 97 F | HEIGHT: 67 IN | RESPIRATION RATE: 18 BRPM | BODY MASS INDEX: 29.03 KG/M2 | DIASTOLIC BLOOD PRESSURE: 69 MMHG | WEIGHT: 185 LBS | HEART RATE: 81 BPM | OXYGEN SATURATION: 95 %

## 2021-03-26 DIAGNOSIS — J32.9 CHRONIC SINUSITIS, UNSPECIFIED LOCATION: Primary | ICD-10-CM

## 2021-03-26 LAB — SARS-COV-2 RNA RESP QL NAA+PROBE: NOT DETECTED

## 2021-03-26 PROCEDURE — 71045 X-RAY EXAM CHEST 1 VIEW: CPT

## 2021-03-26 PROCEDURE — 99283 EMERGENCY DEPT VISIT LOW MDM: CPT

## 2021-03-26 PROCEDURE — 99284 EMERGENCY DEPT VISIT MOD MDM: CPT

## 2021-03-26 PROCEDURE — 87081 CULTURE SCREEN ONLY: CPT | Performed by: EMERGENCY MEDICINE

## 2021-03-26 PROCEDURE — 87430 STREP A AG IA: CPT | Performed by: EMERGENCY MEDICINE

## 2021-03-26 RX ORDER — CLINDAMYCIN HYDROCHLORIDE 300 MG/1
300 CAPSULE ORAL 4 TIMES DAILY
Qty: 30 CAPSULE | Refills: 0 | Status: SHIPPED | OUTPATIENT
Start: 2021-03-26 | End: 2021-04-05

## 2021-03-26 RX ORDER — EMPAGLIFLOZIN 25 MG/1
25 TABLET, FILM COATED ORAL DAILY
COMMUNITY

## 2021-03-26 RX ORDER — GUAIFENESIN 600 MG
1200 TABLET, EXTENDED RELEASE 12 HR ORAL 2 TIMES DAILY
Qty: 20 TABLET | Refills: 0 | Status: SHIPPED | OUTPATIENT
Start: 2021-03-26 | End: 2021-03-31

## 2021-03-26 NOTE — ED INITIAL ASSESSMENT (HPI)
To the ED with c/o nasal congestion x 3 weeks. Seen last week at [facility] and given Augmentin for sinus infection- on last day of 7 day course. 2 days ago began with cough- green mucous production. Mild sore throat. No fever. Breathing non labored.

## 2021-03-26 NOTE — ED PROVIDER NOTES
Patient Seen in: [facility] Emergency Department      History   Patient presents with:  Cough/URI    Stated Complaint: nasal congestion x 3 weeks, being treated for sinus infection without relief    HPI/Subjective:   HPI    The patient is a 56-year- stent circumflex   • APPENDECTOMY     • COLONOSCOPY  4/7/16= Diverticulosis    Repeat 2026   • COLONOSCOPY, POSSIBLE BIOPSY, POSSIBLE POLYPECTOMY 06081 N/A 4/7/2016    Performed by Ami Viramontes MD at [address] [place] [address] sounds a he has a stuffy nose. Otherwise normal phonation. There are dried mucosal membranes along his nostrils and turbinates. No stridor, trismus or drooling. No significant sinus, external ear, or mastoid tenderness or swelling. No lymphadenopathy. COMPARISON:  [facility] , XR, XR CHEST PA + LAT CHEST (CPT=71046), 12/26/2019,     1:29 PM.  [facility] , XR, XR CHEST PA + LAT CHEST (CPT=71020), 5/19/2017,     2:58 PM.         INDICATIONS:  nasal congestion x 3 weeks, being treated for sinus     infection w Refills: 0

## 2021-04-08 PROBLEM — R09.81 NASAL CONGESTION: Status: ACTIVE | Noted: 2021-04-08

## 2021-04-08 PROBLEM — J32.4 CHRONIC PANSINUSITIS: Status: ACTIVE | Noted: 2021-04-08

## 2021-04-11 ENCOUNTER — IMMUNIZATION (OUTPATIENT)
Dept: LAB | Age: 56
End: 2021-04-11
Attending: HOSPITALIST
Payer: COMMERCIAL

## 2021-04-11 DIAGNOSIS — Z23 NEED FOR VACCINATION: Primary | ICD-10-CM

## 2021-04-11 PROCEDURE — 0002A SARSCOV2 VAC 30MCG/0.3ML IM: CPT

## 2021-08-11 ENCOUNTER — LAB ENCOUNTER (OUTPATIENT)
Dept: LAB | Facility: HOSPITAL | Age: 56
End: 2021-08-11
Attending: INTERNAL MEDICINE
Payer: COMMERCIAL

## 2021-08-11 DIAGNOSIS — Z00.00 ANNUAL PHYSICAL EXAM: Primary | ICD-10-CM

## 2021-08-11 DIAGNOSIS — R73.03 PRE-DIABETES: ICD-10-CM

## 2021-08-11 LAB
ALBUMIN SERPL-MCNC: 4.1 G/DL (ref 3.4–5)
ALBUMIN/GLOB SERPL: 1.2 {RATIO} (ref 1–2)
ALP LIVER SERPL-CCNC: 96 U/L
ALT SERPL-CCNC: 70 U/L
ANION GAP SERPL CALC-SCNC: 6 MMOL/L (ref 0–18)
AST SERPL-CCNC: 81 U/L (ref 15–37)
BASOPHILS # BLD AUTO: 0.02 X10(3) UL (ref 0–0.2)
BASOPHILS NFR BLD AUTO: 0.5 %
BILIRUB SERPL-MCNC: 0.6 MG/DL (ref 0.1–2)
BILIRUB UR QL STRIP.AUTO: NEGATIVE
BUN BLD-MCNC: 15 MG/DL (ref 7–18)
CALCIUM BLD-MCNC: 8.7 MG/DL (ref 8.5–10.1)
CHLORIDE SERPL-SCNC: 107 MMOL/L (ref 98–112)
CHOLEST SMN-MCNC: 143 MG/DL (ref ?–200)
CLARITY UR REFRACT.AUTO: CLEAR
CO2 SERPL-SCNC: 27 MMOL/L (ref 21–32)
COLOR UR AUTO: YELLOW
COMPLEXED PSA SERPL-MCNC: 0.27 NG/ML (ref ?–4)
CREAT BLD-MCNC: 0.68 MG/DL
CREAT UR-SCNC: 114 MG/DL
EOSINOPHIL # BLD AUTO: 0.11 X10(3) UL (ref 0–0.7)
EOSINOPHIL NFR BLD AUTO: 2.9 %
ERYTHROCYTE [DISTWIDTH] IN BLOOD BY AUTOMATED COUNT: 12.8 %
EST. AVERAGE GLUCOSE BLD GHB EST-MCNC: 151 MG/DL (ref 68–126)
GLOBULIN PLAS-MCNC: 3.3 G/DL (ref 2.8–4.4)
GLUCOSE BLD-MCNC: 116 MG/DL (ref 70–99)
GLUCOSE UR STRIP.AUTO-MCNC: >=500 MG/DL
HBA1C MFR BLD HPLC: 6.9 % (ref ?–5.7)
HCT VFR BLD AUTO: 43.4 %
HDLC SERPL-MCNC: 41 MG/DL (ref 40–59)
HGB BLD-MCNC: 15.3 G/DL
IMM GRANULOCYTES # BLD AUTO: 0.02 X10(3) UL (ref 0–1)
IMM GRANULOCYTES NFR BLD: 0.5 %
KETONES UR STRIP.AUTO-MCNC: NEGATIVE MG/DL
LDLC SERPL CALC-MCNC: 69 MG/DL (ref ?–100)
LEUKOCYTE ESTERASE UR QL STRIP.AUTO: NEGATIVE
LYMPHOCYTES # BLD AUTO: 0.93 X10(3) UL (ref 1–4)
LYMPHOCYTES NFR BLD AUTO: 24.7 %
M PROTEIN MFR SERPL ELPH: 7.4 G/DL (ref 6.4–8.2)
MCH RBC QN AUTO: 31.7 PG (ref 26–34)
MCHC RBC AUTO-ENTMCNC: 35.3 G/DL (ref 31–37)
MCV RBC AUTO: 89.9 FL
MICROALBUMIN UR-MCNC: 0.95 MG/DL
MICROALBUMIN/CREAT 24H UR-RTO: 8.3 UG/MG (ref ?–30)
MONOCYTES # BLD AUTO: 0.53 X10(3) UL (ref 0.1–1)
MONOCYTES NFR BLD AUTO: 14.1 %
NEUTROPHILS # BLD AUTO: 2.16 X10 (3) UL (ref 1.5–7.7)
NEUTROPHILS # BLD AUTO: 2.16 X10(3) UL (ref 1.5–7.7)
NEUTROPHILS NFR BLD AUTO: 57.3 %
NITRITE UR QL STRIP.AUTO: NEGATIVE
NONHDLC SERPL-MCNC: 102 MG/DL (ref ?–130)
OSMOLALITY SERPL CALC.SUM OF ELEC: 292 MOSM/KG (ref 275–295)
PATIENT FASTING Y/N/NP: YES
PATIENT FASTING Y/N/NP: YES
PH UR STRIP.AUTO: 6 [PH] (ref 5–8)
PLATELET # BLD AUTO: 132 10(3)UL (ref 150–450)
POTASSIUM SERPL-SCNC: 3.9 MMOL/L (ref 3.5–5.1)
PROT UR STRIP.AUTO-MCNC: NEGATIVE MG/DL
RBC # BLD AUTO: 4.83 X10(6)UL
RBC UR QL AUTO: NEGATIVE
SODIUM SERPL-SCNC: 140 MMOL/L (ref 136–145)
SP GR UR STRIP.AUTO: >1.03 (ref 1–1.03)
T4 FREE SERPL-MCNC: 0.8 NG/DL (ref 0.8–1.7)
TRIGL SERPL-MCNC: 200 MG/DL (ref 30–149)
TSI SER-ACNC: 2.26 MIU/ML (ref 0.36–3.74)
UROBILINOGEN UR STRIP.AUTO-MCNC: <2 MG/DL
VLDLC SERPL CALC-MCNC: 30 MG/DL (ref 0–30)
WBC # BLD AUTO: 3.8 X10(3) UL (ref 4–11)

## 2021-08-11 PROCEDURE — 82570 ASSAY OF URINE CREATININE: CPT

## 2021-08-11 PROCEDURE — 83036 HEMOGLOBIN GLYCOSYLATED A1C: CPT

## 2021-08-11 PROCEDURE — 84443 ASSAY THYROID STIM HORMONE: CPT

## 2021-08-11 PROCEDURE — 80053 COMPREHEN METABOLIC PANEL: CPT

## 2021-08-11 PROCEDURE — 36415 COLL VENOUS BLD VENIPUNCTURE: CPT

## 2021-08-11 PROCEDURE — 81001 URINALYSIS AUTO W/SCOPE: CPT

## 2021-08-11 PROCEDURE — 84439 ASSAY OF FREE THYROXINE: CPT

## 2021-08-11 PROCEDURE — 80061 LIPID PANEL: CPT

## 2021-08-11 PROCEDURE — 82043 UR ALBUMIN QUANTITATIVE: CPT

## 2021-08-11 PROCEDURE — 85025 COMPLETE CBC W/AUTO DIFF WBC: CPT

## 2021-10-12 ENCOUNTER — IMMUNIZATION (OUTPATIENT)
Dept: LAB | Facility: HOSPITAL | Age: 56
End: 2021-10-12
Attending: EMERGENCY MEDICINE
Payer: COMMERCIAL

## 2021-10-12 DIAGNOSIS — Z23 NEED FOR VACCINATION: Primary | ICD-10-CM

## 2021-10-12 PROCEDURE — 0003A SARSCOV2 VAC 30MCG/0.3ML IM: CPT

## 2022-02-04 ENCOUNTER — LAB ENCOUNTER (OUTPATIENT)
Dept: LAB | Facility: HOSPITAL | Age: 57
End: 2022-02-04
Attending: INTERNAL MEDICINE
Payer: COMMERCIAL

## 2022-02-04 DIAGNOSIS — E78.5 ELEVATED LIPIDS: ICD-10-CM

## 2022-02-04 DIAGNOSIS — D69.6 DECREASED PLATELET COUNT (HCC): Primary | ICD-10-CM

## 2022-02-04 DIAGNOSIS — R74.8 ABNORMAL LIVER ENZYMES: ICD-10-CM

## 2022-02-04 DIAGNOSIS — R73.09 ELEVATED GLUCOSE: ICD-10-CM

## 2022-02-04 LAB
ALBUMIN SERPL-MCNC: 4 G/DL (ref 3.4–5)
ALBUMIN/GLOB SERPL: 1.3 {RATIO} (ref 1–2)
ALP LIVER SERPL-CCNC: 98 U/L
ALT SERPL-CCNC: 65 U/L
ANION GAP SERPL CALC-SCNC: 5 MMOL/L (ref 0–18)
AST SERPL-CCNC: 80 U/L (ref 15–37)
BASOPHILS # BLD AUTO: 0.02 X10(3) UL (ref 0–0.2)
BASOPHILS NFR BLD AUTO: 0.4 %
BILIRUB SERPL-MCNC: 0.7 MG/DL (ref 0.1–2)
BUN BLD-MCNC: 18 MG/DL (ref 7–18)
CALCIUM BLD-MCNC: 9.6 MG/DL (ref 8.5–10.1)
CHLORIDE SERPL-SCNC: 107 MMOL/L (ref 98–112)
CHOLEST SERPL-MCNC: 135 MG/DL (ref ?–200)
CO2 SERPL-SCNC: 27 MMOL/L (ref 21–32)
CREAT BLD-MCNC: 0.84 MG/DL
EOSINOPHIL # BLD AUTO: 0.17 X10(3) UL (ref 0–0.7)
EOSINOPHIL NFR BLD AUTO: 3.6 %
ERYTHROCYTE [DISTWIDTH] IN BLOOD BY AUTOMATED COUNT: 12.8 %
EST. AVERAGE GLUCOSE BLD GHB EST-MCNC: 154 MG/DL (ref 68–126)
FASTING PATIENT LIPID ANSWER: YES
FASTING STATUS PATIENT QL REPORTED: YES
GLOBULIN PLAS-MCNC: 3.2 G/DL (ref 2.8–4.4)
GLUCOSE BLD-MCNC: 124 MG/DL (ref 70–99)
HBA1C MFR BLD: 7 % (ref ?–5.7)
HCT VFR BLD AUTO: 44.5 %
HDLC SERPL-MCNC: 40 MG/DL (ref 40–59)
HGB BLD-MCNC: 15.2 G/DL
IMM GRANULOCYTES # BLD AUTO: 0.01 X10(3) UL (ref 0–1)
IMM GRANULOCYTES NFR BLD: 0.2 %
LDLC SERPL CALC-MCNC: 55 MG/DL (ref ?–100)
LYMPHOCYTES # BLD AUTO: 1.15 X10(3) UL (ref 1–4)
LYMPHOCYTES NFR BLD AUTO: 24.1 %
MCH RBC QN AUTO: 31.2 PG (ref 26–34)
MCHC RBC AUTO-ENTMCNC: 34.2 G/DL (ref 31–37)
MCV RBC AUTO: 91.4 FL
MONOCYTES # BLD AUTO: 0.58 X10(3) UL (ref 0.1–1)
MONOCYTES NFR BLD AUTO: 12.1 %
NEUTROPHILS # BLD AUTO: 2.85 X10 (3) UL (ref 1.5–7.7)
NEUTROPHILS # BLD AUTO: 2.85 X10(3) UL (ref 1.5–7.7)
NEUTROPHILS NFR BLD AUTO: 59.6 %
NONHDLC SERPL-MCNC: 95 MG/DL (ref ?–130)
OSMOLALITY SERPL CALC.SUM OF ELEC: 291 MOSM/KG (ref 275–295)
PLATELET # BLD AUTO: 141 10(3)UL (ref 150–450)
POTASSIUM SERPL-SCNC: 4.3 MMOL/L (ref 3.5–5.1)
PROT SERPL-MCNC: 7.2 G/DL (ref 6.4–8.2)
RBC # BLD AUTO: 4.87 X10(6)UL
SODIUM SERPL-SCNC: 139 MMOL/L (ref 136–145)
TRIGL SERPL-MCNC: 252 MG/DL (ref 30–149)
VLDLC SERPL CALC-MCNC: 37 MG/DL (ref 0–30)
WBC # BLD AUTO: 4.8 X10(3) UL (ref 4–11)

## 2022-02-04 PROCEDURE — 80053 COMPREHEN METABOLIC PANEL: CPT

## 2022-02-04 PROCEDURE — 80061 LIPID PANEL: CPT

## 2022-02-04 PROCEDURE — 83036 HEMOGLOBIN GLYCOSYLATED A1C: CPT

## 2022-02-04 PROCEDURE — 36415 COLL VENOUS BLD VENIPUNCTURE: CPT

## 2022-02-04 PROCEDURE — 85025 COMPLETE CBC W/AUTO DIFF WBC: CPT

## 2022-09-21 ENCOUNTER — HOSPITAL ENCOUNTER (OUTPATIENT)
Dept: INTERVENTIONAL RADIOLOGY/VASCULAR | Facility: HOSPITAL | Age: 57
Discharge: HOME | End: 2022-09-21
Attending: INTERNAL MEDICINE | Admitting: INTERNAL MEDICINE

## 2022-09-21 VITALS
TEMPERATURE: 97 F | WEIGHT: 185 LBS | SYSTOLIC BLOOD PRESSURE: 131 MMHG | DIASTOLIC BLOOD PRESSURE: 82 MMHG | BODY MASS INDEX: 29.03 KG/M2 | OXYGEN SATURATION: 95 % | HEART RATE: 66 BPM | HEIGHT: 67 IN | RESPIRATION RATE: 13 BRPM

## 2022-09-21 DIAGNOSIS — I25.10 CAD (CORONARY ARTERY DISEASE): ICD-10-CM

## 2022-09-21 DIAGNOSIS — R07.9 CHEST PAIN: ICD-10-CM

## 2022-09-21 LAB
GLUCOSE BLD-MCNC: 120 MG/DL (ref 70–99)
ISTAT ACTIVATED CLOTTING TIME: 190 SECONDS (ref 74–137)
ISTAT ACTIVATED CLOTTING TIME: 260 SECONDS (ref 74–137)
SARS-COV-2 RNA RESP QL NAA+PROBE: NOT DETECTED

## 2022-09-21 PROCEDURE — 85347 COAGULATION TIME ACTIVATED: CPT

## 2022-09-21 PROCEDURE — 99152 MOD SED SAME PHYS/QHP 5/>YRS: CPT | Performed by: INTERNAL MEDICINE

## 2022-09-21 PROCEDURE — 4A033BC MEASUREMENT OF ARTERIAL PRESSURE, CORONARY, PERCUTANEOUS APPROACH: ICD-10-PCS | Performed by: INTERNAL MEDICINE

## 2022-09-21 PROCEDURE — 93571 IV DOP VEL&/PRESS C FLO 1ST: CPT | Performed by: INTERNAL MEDICINE

## 2022-09-21 PROCEDURE — B211YZZ FLUOROSCOPY OF MULTIPLE CORONARY ARTERIES USING OTHER CONTRAST: ICD-10-PCS | Performed by: INTERNAL MEDICINE

## 2022-09-21 PROCEDURE — B215YZZ FLUOROSCOPY OF LEFT HEART USING OTHER CONTRAST: ICD-10-PCS | Performed by: INTERNAL MEDICINE

## 2022-09-21 PROCEDURE — 99153 MOD SED SAME PHYS/QHP EA: CPT | Performed by: INTERNAL MEDICINE

## 2022-09-21 PROCEDURE — 93458 L HRT ARTERY/VENTRICLE ANGIO: CPT | Performed by: INTERNAL MEDICINE

## 2022-09-21 PROCEDURE — 99211 OFF/OP EST MAY X REQ PHY/QHP: CPT

## 2022-09-21 PROCEDURE — 02703ZZ DILATION OF CORONARY ARTERY, ONE ARTERY, PERCUTANEOUS APPROACH: ICD-10-PCS | Performed by: INTERNAL MEDICINE

## 2022-09-21 PROCEDURE — 82962 GLUCOSE BLOOD TEST: CPT

## 2022-09-21 PROCEDURE — 4A023N7 MEASUREMENT OF CARDIAC SAMPLING AND PRESSURE, LEFT HEART, PERCUTANEOUS APPROACH: ICD-10-PCS | Performed by: INTERNAL MEDICINE

## 2022-09-21 PROCEDURE — 027034Z DILATION OF CORONARY ARTERY, ONE ARTERY WITH DRUG-ELUTING INTRALUMINAL DEVICE, PERCUTANEOUS APPROACH: ICD-10-PCS | Performed by: INTERNAL MEDICINE

## 2022-09-21 PROCEDURE — 92921 HC PTCA EA ADDL MAJOR CORONARY ARTERY/BRANCH: CPT | Performed by: INTERNAL MEDICINE

## 2022-09-21 RX ORDER — ASPIRIN 81 MG/1
81 TABLET ORAL DAILY
Status: DISCONTINUED | OUTPATIENT
Start: 2022-09-22 | End: 2022-09-21

## 2022-09-21 RX ORDER — LIDOCAINE HYDROCHLORIDE 10 MG/ML
INJECTION, SOLUTION EPIDURAL; INFILTRATION; INTRACAUDAL; PERINEURAL
Status: COMPLETED
Start: 2022-09-21 | End: 2022-09-21

## 2022-09-21 RX ORDER — CLOPIDOGREL BISULFATE 75 MG/1
TABLET ORAL
Status: COMPLETED
Start: 2022-09-21 | End: 2022-09-21

## 2022-09-21 RX ORDER — ADENOSINE 3 MG/ML
INJECTION, SOLUTION INTRAVENOUS
Status: COMPLETED
Start: 2022-09-21 | End: 2022-09-21

## 2022-09-21 RX ORDER — VERAPAMIL HYDROCHLORIDE 2.5 MG/ML
INJECTION, SOLUTION INTRAVENOUS
Status: COMPLETED
Start: 2022-09-21 | End: 2022-09-21

## 2022-09-21 RX ORDER — SODIUM CHLORIDE 9 MG/ML
INJECTION, SOLUTION INTRAVENOUS
Status: COMPLETED | OUTPATIENT
Start: 2022-09-22 | End: 2022-09-21

## 2022-09-21 RX ORDER — MIDAZOLAM HYDROCHLORIDE 1 MG/ML
INJECTION INTRAMUSCULAR; INTRAVENOUS
Status: COMPLETED
Start: 2022-09-21 | End: 2022-09-21

## 2022-09-21 RX ORDER — HEPARIN SODIUM 5000 [USP'U]/ML
INJECTION, SOLUTION INTRAVENOUS; SUBCUTANEOUS
Status: COMPLETED
Start: 2022-09-21 | End: 2022-09-21

## 2022-09-21 RX ORDER — CLOPIDOGREL BISULFATE 75 MG/1
75 TABLET ORAL DAILY
Status: DISCONTINUED | OUTPATIENT
Start: 2022-09-22 | End: 2022-09-21

## 2022-09-21 RX ORDER — NITROGLYCERIN 20 MG/100ML
INJECTION INTRAVENOUS
Status: COMPLETED
Start: 2022-09-21 | End: 2022-09-21

## 2022-09-21 RX ORDER — CLOPIDOGREL BISULFATE 75 MG/1
75 TABLET ORAL DAILY
Qty: 90 TABLET | Refills: 3 | Status: SHIPPED | OUTPATIENT
Start: 2022-09-22

## 2022-09-21 RX ORDER — SODIUM CHLORIDE 9 MG/ML
INJECTION, SOLUTION INTRAVENOUS CONTINUOUS
Status: DISCONTINUED | OUTPATIENT
Start: 2022-09-21 | End: 2022-09-21

## 2022-09-21 RX ADMIN — SODIUM CHLORIDE: 9 INJECTION, SOLUTION INTRAVENOUS at 08:41:00

## 2022-09-21 NOTE — PROGRESS NOTES
Rc'd pt from cath lab s/p PCI in stable condition. VSS. VascBand to right wrist in place with 8mls of air. Site is soft, clean and dry. No bleeding or hematoma. Good pulse and pleth waveform. Pt denies c/o pain or discomfort. Dr Isabel Davisonors at bedside and spoke with pts wife over the phone. 12L EKG done. Cardiac Rehab notified. Script for Plavix called into [facility].     14:30: After 90 mins, air was slowly removed from VascBand and a pressure dressing was applied. Site is stable. Cardiac rehab at bedside. Pt tolerated po intake well. Pt ambulated and tolerated well. Voided. IV removed with catheter intact. Reviewed discharge instructions, verbalizes understanding. Home via w/c in stable condition without c/o. Pts wife is the .

## 2022-09-21 NOTE — DIETARY NOTE
Clinical Nutrition    Dietitian consult received per cardiac rehab standing order. Pt to be educated by cardiac rehab staff and encouraged to attend outpatient classes taught by RD. RD available PRN.     Akila Santana MS, RD, LDN  Clinical Dietitian  Pager #: [phone]

## 2022-09-21 NOTE — CARDIAC REHAB
CAD teaching complete cardiac rehab offered but did CR in the past does not think he will do again but will talk to MD about it. Coming back next week for another procedure. Stent card given by primary RN in folder.

## 2022-09-21 NOTE — PROCEDURES
[address] Location: Cath Lab    [facility] [id number] MRN [id number]   Admission Date 9/21/2022 Procedure Date 9/21/2022   Attending Physician Laila Rivas MD Procedure Physician Javad Carr MD         CARDIAC CATHETERIZATION/PERCUTANEOUS CORONARY INTERVENTION REPORT     PREOPERATIVE DIAGNOSIS:  chest pain, coronary artery disease with prior PCI to the LCx  POSTOPERATIVE DIAGNOSIS:  same as above, obstructive LAD and diagonal disease. PROCEDURE PERFORMED:  left heart catheterization, left ventriculogram, selective coronary angiography, FFR guided PCI to the diagonal branch, FFR to the LAD      PROCEDURE:  The patient was brought to the cardiac catheterization lab in the fasting state. Informed consent was obtained. Moderate sedation was employed using a total of IV Versed 5mg and IV fentanyl 125mcg. I directly observed the patient from [id number] to [id number], for a total of 122 minutes, and an independent trained observer was present and assisted in the monitoring of the patient's level of consciousness and physiological status, watching the heart rate, blood pressure, oximetry, and rhythm, in addition to total moderation time. ACCESS/CATHETER PLACEMENT:   The right radial area was prepped and draped in a sterile manner and anesthetized with 2% lidocaine. The right radial artery was accessed, and a 6-Rwandan, 11 cm sheath was placed. Left and right selective coronary angiography was performed using a 6F Tiger4. 0 catheter. A pigtail catheter was used to cross the aortic valve, measure left ventricular pressure and perform a 30 degree WHEELER ventriculogram.  The catheter was pulled across the valve to assess for aortic stenosis. At the conclusion of the study, the right radial artery hemostasis was performed using a radial band. FINDINGS:      1.   Left heart catheterization:    Left ventricle: 108/15 mmHg  Aorta: 93/58/77 mmHg  Left ventriculogram demonstrated a LV ejection fraction of 55-60% without mitral regurgitation; there are no regional wall motion abnormalities. There was no aortic stenosis upon pullback of the catheter. 2.  Selective coronary angiography:      Left main artery: The left main artery is a medium size bifurcating vessel without significant angiographic disease. LAD:  The left anterior descending artery is a medium caliber vessel that gives rise to a small proximal diagonal and a medium caliber, branching diagonal artery off the mid vessel. The mid LAD demonstrates a focal 50-60% stenosis, followed by an eccentric 70% bifurcation stenosis involving the mid diagonal branch. The diagonal itself has a severe 80% eccentric stenosis where it bifurcates into two branches. LCx: The left circumflex artery is a medium caliber vessel giving rise to a medium obtuse marginal.  The previously deployed stent is widely patent. RCA:  The right coronary artery is a large caliber, dominant vessel that gives rise to a large rPDA and distal rPL branch. Mild luminal irregularities present. INTERVENTIONAL PROCEDURE: Heparin was used for systemic anticoagulation, confirmed therapeutic by ACT measurements. The LM artery was engaged with a 6F EBU3.75 guide catheter. Next, hemodynamic testing was performed to both the LAD and the diagonal branch with a [employer] wire. Diastolic flow reserve (DFR) was borderline in the LAD 0.89, followed by FFR = 0.76. Next, the wire was re-directed to the diagonal branch, repeat FFR = 0.78. We concluded that the LAD and diagonal were both hemodynamically obstructive. We directed our attention to the diagonal branch. The branches (deemed diagonal 1 and diagonal 2), were wired with Runthrough and Prowater wires, respectively, and pre-dilation performed with a 2.0x12mm balloon to both limbs. Next, a 2.56z39pt Synergy XD ARLYN was deployed and post-dilated with a 2.5x12mm NC balloon.  The side branch wire was re-directed through a stent strut; unjailing the ostium was challenging and unsuccessfully crossed with a 1.5x12mm Emerge balloon. However, a lower profile 1.2x12mm Takeru balloon did cross and inflated to high pressure. Kissing balloon inflation was performed with 2.5x12mm and 2.0x12mm NC balloons. Completion angiography demonstrated a good result with 0% residual stenosis, TIMI3 distal flow without vessel perforation or dissection. Moderate vasospasm was noted beyond the stent edge that modestly improved with IC NTG. In consideration of radiation time, contrast exposure and overall procedural time, we deferred the LAD intervention and will pursue it in the staged fashion from the femoral approach. MEDICATIONS:  See nursing record. COMPLICATIONS:  No major complications were observed during this visit to the catheterization lab. IMPRESSION:    1. Left heart catheterization: LVEDP 15mmHg, no aortic stenosis. LVEF 55-60% with normal wall motion. No significant mitral regurgitation. 2.  Coronary angiography:  right dominant system  - LM: no significant angiographic disease  - LAD: 70% mid bifurcation stenosis, diagonal- 80% mid stenosis. - LCx: patent stent   - RCA: mild luminal irregularities  3. Percutaneous coronary intervention:  Successful FFR guided PCI to the diagonal branch with a 2.59o21iu Synergy XD ARLYN. Successful FFR to the LAD. RECOMMENDATIONS: DAPT (asa/clopidogrel). Will bring back for staged PCI to the LAD bifurcation lesion, from the femoral approach.

## 2022-09-28 ENCOUNTER — HOSPITAL ENCOUNTER (OUTPATIENT)
Dept: INTERVENTIONAL RADIOLOGY/VASCULAR | Facility: HOSPITAL | Age: 57
Discharge: HOME | End: 2022-09-28
Attending: INTERNAL MEDICINE | Admitting: INTERNAL MEDICINE
Payer: COMMERCIAL

## 2022-09-28 VITALS
TEMPERATURE: 97 F | BODY MASS INDEX: 29.03 KG/M2 | HEIGHT: 67 IN | DIASTOLIC BLOOD PRESSURE: 77 MMHG | WEIGHT: 185 LBS | HEART RATE: 58 BPM | SYSTOLIC BLOOD PRESSURE: 121 MMHG | OXYGEN SATURATION: 94 % | RESPIRATION RATE: 13 BRPM

## 2022-09-28 DIAGNOSIS — I21.4 NSTEMI (NON-ST ELEVATED MYOCARDIAL INFARCTION) (HCC): ICD-10-CM

## 2022-09-28 DIAGNOSIS — E78.00 HYPERCHOLESTEREMIA: ICD-10-CM

## 2022-09-28 DIAGNOSIS — I25.10 CAD (CORONARY ARTERY DISEASE): ICD-10-CM

## 2022-09-28 LAB
GLUCOSE BLD-MCNC: 150 MG/DL (ref 70–99)
ISTAT ACTIVATED CLOTTING TIME: 318 SECONDS (ref 74–137)
SARS-COV-2 RNA RESP QL NAA+PROBE: NOT DETECTED

## 2022-09-28 PROCEDURE — 99152 MOD SED SAME PHYS/QHP 5/>YRS: CPT | Performed by: INTERNAL MEDICINE

## 2022-09-28 PROCEDURE — 85347 COAGULATION TIME ACTIVATED: CPT

## 2022-09-28 PROCEDURE — 93010 ELECTROCARDIOGRAM REPORT: CPT | Performed by: INTERNAL MEDICINE

## 2022-09-28 PROCEDURE — 99153 MOD SED SAME PHYS/QHP EA: CPT | Performed by: INTERNAL MEDICINE

## 2022-09-28 PROCEDURE — 99211 OFF/OP EST MAY X REQ PHY/QHP: CPT

## 2022-09-28 PROCEDURE — 82962 GLUCOSE BLOOD TEST: CPT

## 2022-09-28 PROCEDURE — 92921 HC PTCA EA ADDL MAJOR CORONARY ARTERY/BRANCH: CPT | Performed by: INTERNAL MEDICINE

## 2022-09-28 PROCEDURE — 027034Z DILATION OF CORONARY ARTERY, ONE ARTERY WITH DRUG-ELUTING INTRALUMINAL DEVICE, PERCUTANEOUS APPROACH: ICD-10-PCS | Performed by: INTERNAL MEDICINE

## 2022-09-28 PROCEDURE — 92978 ENDOLUMINL IVUS OCT C 1ST: CPT | Performed by: INTERNAL MEDICINE

## 2022-09-28 PROCEDURE — B240ZZ3 ULTRASONOGRAPHY OF SINGLE CORONARY ARTERY, INTRAVASCULAR: ICD-10-PCS | Performed by: INTERNAL MEDICINE

## 2022-09-28 PROCEDURE — 93005 ELECTROCARDIOGRAM TRACING: CPT

## 2022-09-28 RX ORDER — CLOPIDOGREL BISULFATE 75 MG/1
TABLET ORAL
Status: COMPLETED
Start: 2022-09-28 | End: 2022-09-28

## 2022-09-28 RX ORDER — DIPHENHYDRAMINE HYDROCHLORIDE 50 MG/ML
INJECTION INTRAMUSCULAR; INTRAVENOUS
Status: COMPLETED
Start: 2022-09-28 | End: 2022-09-28

## 2022-09-28 RX ORDER — LIDOCAINE HYDROCHLORIDE 10 MG/ML
INJECTION, SOLUTION EPIDURAL; INFILTRATION; INTRACAUDAL; PERINEURAL
Status: COMPLETED
Start: 2022-09-28 | End: 2022-09-28

## 2022-09-28 RX ORDER — CLOPIDOGREL BISULFATE 75 MG/1
75 TABLET ORAL DAILY
Status: DISCONTINUED | OUTPATIENT
Start: 2022-09-28 | End: 2022-09-28

## 2022-09-28 RX ORDER — MIDAZOLAM HYDROCHLORIDE 1 MG/ML
INJECTION INTRAMUSCULAR; INTRAVENOUS
Status: COMPLETED
Start: 2022-09-28 | End: 2022-09-28

## 2022-09-28 RX ORDER — SODIUM CHLORIDE 9 MG/ML
INJECTION, SOLUTION INTRAVENOUS
Status: DISCONTINUED | OUTPATIENT
Start: 2022-09-29 | End: 2022-09-28 | Stop reason: HOSPADM

## 2022-09-28 RX ORDER — HEPARIN SODIUM 5000 [USP'U]/ML
INJECTION, SOLUTION INTRAVENOUS; SUBCUTANEOUS
Status: COMPLETED
Start: 2022-09-28 | End: 2022-09-28

## 2022-09-28 RX ORDER — CLOPIDOGREL BISULFATE 75 MG/1
75 TABLET ORAL DAILY
Qty: 30 TABLET | Refills: 3 | Status: SHIPPED | OUTPATIENT
Start: 2022-09-28 | End: 2022-09-28

## 2022-09-28 NOTE — CARDIAC REHAB
Cardiac rehab education completed. Stent card given. Offered Cardiac Rehabilitation phase II. Declined at this time. Offered Virtual option as well. Declined at this time.  Pt exercises 5-6 hours a week, plays hockey, etc.

## 2022-09-28 NOTE — PROCEDURES
[address] Location: Cath Lab    [facility] [id number] MRN [id number]   Admission Date 9/28/2022 Procedure Date 9/28/2022   Attending Physician Karyle Pintos, MD Procedure Physician Az Mendes MD         PERCUTANEOUS CORONARY INTERVENTION REPORT     PREOPERATIVE DIAGNOSIS:  chest pain, CAD s/p recent PCI to diagonal, here for staged PCI to the LAD  POSTOPERATIVE DIAGNOSIS:  same as above. PROCEDURE PERFORMED:  selective coronary angiography, IVUS guided PCI to the LAD      PROCEDURE:  The patient was brought to the cardiac catheterization lab in the fasting state. Informed consent was obtained. Moderate sedation was employed using a total of IV Versed 4mg and IV fentanyl 100mcg. I directly observed the patient from 0912 to 1012, for a total of 60 minutes, and an independent trained observer was present and assisted in the monitoring of the patient's level of consciousness and physiological status, watching the heart rate, blood pressure, oximetry, and rhythm, in addition to total moderation time. ACCESS/CATHETER PLACEMENT:  The groin was prepped and draped in a sterile manner, and the right groin area was anesthetized with 2% lidocaine area. The right femoral artery was accessed under utlrasound guidance with a micropuncture needle, and a 7-Belizean 11 cm sheath was placed. Left selective coronary angiography was performed using 7-Belizean EBU3.5 catheter. See below for interventional details. At the conclusion of the study, right femoral artery angiography was performed to determine suitability for a closure; a Perclose suture was deployed, with excellent hemostasis. INTERVENTIONAL PROCEDURE: Heparin was used for systemic anticoagulation, confirmed therapeutic by ACT measurement. The LM artery was engaged with a 7F EBU3.5 guide catheter, selective coronary angiography re-demonstrated the 70-80% mid stenosis spanning the diagonal branch.   The previously deployed mid diagonal stent is widely patent. Runthrough and Prowater wires were advanced to the distal LAD and diagonal branches, respectively. IVUS [facility]), demonstrated the LAD distal reference size of 3.0mm and 4.0mm proximally. IVUS was performed to the diagonal branch as well; the mid stent is fully expanded/apposed, the ostium demonstrates mild eccentric calcification and measures 2.6x3.3mm (CSA 6.7mm2). Based on the non-obstructive disease of the ostial diagonal, we decided to proceed with a single stent, provisional technique. Next, a 3.0x28mm Synergy XD ARLYN was deployed in the LAD. The diagonal wire was re-directed through a stent strut and unjailed with a 2.0x12mm NC balloon to high pressure. Finally, stent post-dilation was performed with 3.0x12mm (distal) and 4.0x12mm (proximal) NC balloons. Completion angiography demonstrated a good result with 0% residual stenosis, brisk TIMI3 flow into the LAD and diagonal branches, no dissection/perforation identified. MEDICATIONS:  See nursing record. COMPLICATIONS:  No major complications were observed during this visit to the catheterization lab. CONCLUSION: Successful IVUS guided provisional PCI to the mid LAD with a 3.0x28mm Synergy XD ARLYN. RECOMMENDATIONS: DAPT (asa/clopidogrel) x 3-6 month minimum, uninterrupted.

## 2022-09-28 NOTE — PROGRESS NOTES
Pt post PCI. Pt sleepy but alert. Vss. Right fem artery access site is soft, drsg is CDI. After required bedrest time, Pt up to bathroom. Right fem artery access site remained free from bleeding or hematoma. Recovery complete per protocol. Right fem artery access site remained CDI. Discharge instructions reviewed, IV dc'd and pt discharged home with wife driving.

## 2022-09-29 LAB
ATRIAL RATE: 62 BPM
P AXIS: -6 DEGREES
P-R INTERVAL: 184 MS
Q-T INTERVAL: 420 MS
QRS DURATION: 104 MS
QTC CALCULATION (BEZET): 426 MS
R AXIS: -13 DEGREES
T AXIS: 97 DEGREES
VENTRICULAR RATE: 62 BPM

## 2022-10-18 ENCOUNTER — LAB ENCOUNTER (OUTPATIENT)
Dept: LAB | Facility: HOSPITAL | Age: 57
End: 2022-10-18
Attending: INTERNAL MEDICINE
Payer: COMMERCIAL

## 2022-10-18 DIAGNOSIS — Z00.00 ROUTINE GENERAL MEDICAL EXAMINATION AT A HEALTH CARE FACILITY: Primary | ICD-10-CM

## 2022-10-18 LAB
ALBUMIN SERPL-MCNC: 4.1 G/DL (ref 3.4–5)
ALBUMIN/GLOB SERPL: 1.1 {RATIO} (ref 1–2)
ALP LIVER SERPL-CCNC: 97 U/L
ALT SERPL-CCNC: 62 U/L
ANION GAP SERPL CALC-SCNC: 5 MMOL/L (ref 0–18)
AST SERPL-CCNC: 81 U/L (ref 15–37)
BASOPHILS # BLD AUTO: 0.01 X10(3) UL (ref 0–0.2)
BASOPHILS NFR BLD AUTO: 0.3 %
BILIRUB SERPL-MCNC: 0.6 MG/DL (ref 0.1–2)
BILIRUB UR QL STRIP.AUTO: NEGATIVE
BUN BLD-MCNC: 18 MG/DL (ref 7–18)
CALCIUM BLD-MCNC: 9.3 MG/DL (ref 8.5–10.1)
CHLORIDE SERPL-SCNC: 103 MMOL/L (ref 98–112)
CHOLEST SERPL-MCNC: 161 MG/DL (ref ?–200)
CLARITY UR REFRACT.AUTO: CLEAR
CO2 SERPL-SCNC: 27 MMOL/L (ref 21–32)
COLOR UR AUTO: YELLOW
COMPLEXED PSA SERPL-MCNC: 0.37 NG/ML (ref ?–4)
CREAT BLD-MCNC: 0.92 MG/DL
CREAT UR-SCNC: 147 MG/DL
EOSINOPHIL # BLD AUTO: 0.12 X10(3) UL (ref 0–0.7)
EOSINOPHIL NFR BLD AUTO: 3.4 %
ERYTHROCYTE [DISTWIDTH] IN BLOOD BY AUTOMATED COUNT: 12.8 %
EST. AVERAGE GLUCOSE BLD GHB EST-MCNC: 154 MG/DL (ref 68–126)
FASTING PATIENT LIPID ANSWER: YES
FASTING STATUS PATIENT QL REPORTED: YES
GFR SERPLBLD BASED ON 1.73 SQ M-ARVRAT: 96 ML/MIN/1.73M2 (ref 60–?)
GLOBULIN PLAS-MCNC: 3.7 G/DL (ref 2.8–4.4)
GLUCOSE BLD-MCNC: 135 MG/DL (ref 70–99)
GLUCOSE UR STRIP.AUTO-MCNC: >=500 MG/DL
HBA1C MFR BLD: 7 % (ref ?–5.7)
HCT VFR BLD AUTO: 46.7 %
HDLC SERPL-MCNC: 37 MG/DL (ref 40–59)
HGB BLD-MCNC: 16.1 G/DL
IMM GRANULOCYTES # BLD AUTO: 0.01 X10(3) UL (ref 0–1)
IMM GRANULOCYTES NFR BLD: 0.3 %
KETONES UR STRIP.AUTO-MCNC: 20 MG/DL
LDLC SERPL CALC-MCNC: 68 MG/DL (ref ?–100)
LEUKOCYTE ESTERASE UR QL STRIP.AUTO: NEGATIVE
LYMPHOCYTES # BLD AUTO: 0.79 X10(3) UL (ref 1–4)
LYMPHOCYTES NFR BLD AUTO: 22.6 %
MCH RBC QN AUTO: 31.3 PG (ref 26–34)
MCHC RBC AUTO-ENTMCNC: 34.5 G/DL (ref 31–37)
MCV RBC AUTO: 90.7 FL
MICROALBUMIN UR-MCNC: 3.95 MG/DL
MICROALBUMIN/CREAT 24H UR-RTO: 26.9 UG/MG (ref ?–30)
MONOCYTES # BLD AUTO: 0.47 X10(3) UL (ref 0.1–1)
MONOCYTES NFR BLD AUTO: 13.4 %
NEUTROPHILS # BLD AUTO: 2.1 X10 (3) UL (ref 1.5–7.7)
NEUTROPHILS # BLD AUTO: 2.1 X10(3) UL (ref 1.5–7.7)
NEUTROPHILS NFR BLD AUTO: 60 %
NITRITE UR QL STRIP.AUTO: NEGATIVE
NONHDLC SERPL-MCNC: 124 MG/DL (ref ?–130)
OSMOLALITY SERPL CALC.SUM OF ELEC: 284 MOSM/KG (ref 275–295)
PH UR STRIP.AUTO: 5 [PH] (ref 5–8)
PLATELET # BLD AUTO: 129 10(3)UL (ref 150–450)
POTASSIUM SERPL-SCNC: 4.4 MMOL/L (ref 3.5–5.1)
PROT SERPL-MCNC: 7.8 G/DL (ref 6.4–8.2)
PROT UR STRIP.AUTO-MCNC: NEGATIVE MG/DL
RBC # BLD AUTO: 5.15 X10(6)UL
RBC UR QL AUTO: NEGATIVE
SODIUM SERPL-SCNC: 135 MMOL/L (ref 136–145)
SP GR UR STRIP.AUTO: >1.03 (ref 1–1.03)
T4 FREE SERPL-MCNC: 0.9 NG/DL (ref 0.8–1.7)
TRIGL SERPL-MCNC: 359 MG/DL (ref 30–149)
TSI SER-ACNC: 1.95 MIU/ML (ref 0.36–3.74)
UROBILINOGEN UR STRIP.AUTO-MCNC: <2 MG/DL
VLDLC SERPL CALC-MCNC: 55 MG/DL (ref 0–30)
WBC # BLD AUTO: 3.5 X10(3) UL (ref 4–11)

## 2022-10-18 PROCEDURE — 82043 UR ALBUMIN QUANTITATIVE: CPT

## 2022-10-18 PROCEDURE — 80061 LIPID PANEL: CPT

## 2022-10-18 PROCEDURE — 84439 ASSAY OF FREE THYROXINE: CPT

## 2022-10-18 PROCEDURE — 82570 ASSAY OF URINE CREATININE: CPT

## 2022-10-18 PROCEDURE — 83036 HEMOGLOBIN GLYCOSYLATED A1C: CPT

## 2022-10-18 PROCEDURE — 80053 COMPREHEN METABOLIC PANEL: CPT

## 2022-10-18 PROCEDURE — 81003 URINALYSIS AUTO W/O SCOPE: CPT

## 2022-10-18 PROCEDURE — 36415 COLL VENOUS BLD VENIPUNCTURE: CPT

## 2022-10-18 PROCEDURE — 85025 COMPLETE CBC W/AUTO DIFF WBC: CPT

## 2022-10-18 PROCEDURE — 84443 ASSAY THYROID STIM HORMONE: CPT

## 2022-11-04 ENCOUNTER — LAB ENCOUNTER (OUTPATIENT)
Dept: LAB | Facility: HOSPITAL | Age: 57
End: 2022-11-04
Attending: INTERNAL MEDICINE
Payer: COMMERCIAL

## 2022-11-04 DIAGNOSIS — R74.8 ELEVATED LIVER ENZYMES: Primary | ICD-10-CM

## 2022-11-04 LAB
HAV IGM SER QL: NONREACTIVE
HBV CORE IGM SER QL: NONREACTIVE
HBV SURFACE AG SERPL QL IA: NONREACTIVE
HCV AB SERPL QL IA: NONREACTIVE

## 2022-11-04 PROCEDURE — 80074 ACUTE HEPATITIS PANEL: CPT

## 2022-11-04 PROCEDURE — 36415 COLL VENOUS BLD VENIPUNCTURE: CPT

## 2022-11-29 ENCOUNTER — HOSPITAL ENCOUNTER (OUTPATIENT)
Dept: ULTRASOUND IMAGING | Age: 57
Discharge: HOME | End: 2022-11-29
Attending: INTERNAL MEDICINE
Payer: COMMERCIAL

## 2022-11-29 DIAGNOSIS — R74.8 ELEVATED LIVER ENZYMES: ICD-10-CM

## 2022-11-29 PROCEDURE — 76700 US EXAM ABDOM COMPLETE: CPT | Performed by: INTERNAL MEDICINE

## 2022-12-13 ENCOUNTER — HOSPITAL ENCOUNTER (OUTPATIENT)
Dept: MRI IMAGING | Facility: HOSPITAL | Age: 57
Discharge: HOME | End: 2022-12-13
Attending: INTERNAL MEDICINE
Payer: COMMERCIAL

## 2022-12-13 DIAGNOSIS — R51.9 SEVERE HEADACHE: ICD-10-CM

## 2022-12-13 DIAGNOSIS — Z80.8 FH: BRAIN CANCER: ICD-10-CM

## 2022-12-13 PROCEDURE — A9575 INJ GADOTERATE MEGLUMI 0.1ML: HCPCS | Performed by: INTERNAL MEDICINE

## 2022-12-13 PROCEDURE — 70553 MRI BRAIN STEM W/O & W/DYE: CPT | Performed by: INTERNAL MEDICINE

## 2022-12-13 RX ORDER — GADOTERATE MEGLUMINE 376.9 MG/ML
20 INJECTION INTRAVENOUS
Status: COMPLETED | OUTPATIENT
Start: 2022-12-13 | End: 2022-12-13

## 2022-12-13 RX ADMIN — GADOTERATE MEGLUMINE 16 ML: 376.9 INJECTION INTRAVENOUS at 08:19:00

## 2023-02-03 ENCOUNTER — LAB ENCOUNTER (OUTPATIENT)
Dept: LAB | Facility: HOSPITAL | Age: 58
End: 2023-02-03
Attending: INTERNAL MEDICINE
Payer: COMMERCIAL

## 2023-02-03 DIAGNOSIS — R74.8 ELEVATED LIVER ENZYMES: Primary | ICD-10-CM

## 2023-02-03 LAB
ALT SERPL-CCNC: 69 U/L
AST SERPL-CCNC: 94 U/L (ref 15–37)

## 2023-02-03 PROCEDURE — 84450 TRANSFERASE (AST) (SGOT): CPT

## 2023-02-03 PROCEDURE — 84460 ALANINE AMINO (ALT) (SGPT): CPT

## 2023-02-03 PROCEDURE — 36415 COLL VENOUS BLD VENIPUNCTURE: CPT

## 2023-03-06 ENCOUNTER — OFFICE VISIT (OUTPATIENT)
Dept: NEUROLOGY | Facility: CLINIC | Age: 58
End: 2023-03-06
Payer: COMMERCIAL

## 2023-03-06 VITALS
BODY MASS INDEX: 29.03 KG/M2 | HEIGHT: 67 IN | WEIGHT: 185 LBS | SYSTOLIC BLOOD PRESSURE: 92 MMHG | RESPIRATION RATE: 16 BRPM | DIASTOLIC BLOOD PRESSURE: 54 MMHG | HEART RATE: 90 BPM

## 2023-03-06 DIAGNOSIS — R53.82 CHRONIC FATIGUE: ICD-10-CM

## 2023-03-06 DIAGNOSIS — U09.9 LONG COVID: ICD-10-CM

## 2023-03-06 DIAGNOSIS — M79.10 MUSCLE PAIN: ICD-10-CM

## 2023-03-06 DIAGNOSIS — M79.10 MYALGIA: ICD-10-CM

## 2023-03-06 DIAGNOSIS — I67.9 SMALL VESSEL DISEASE, CEREBROVASCULAR: ICD-10-CM

## 2023-03-06 DIAGNOSIS — I63.131 CEREBROVASCULAR ACCIDENT (CVA) DUE TO EMBOLISM OF RIGHT CAROTID ARTERY (HCC): Primary | ICD-10-CM

## 2023-03-06 RX ORDER — ATORVASTATIN CALCIUM 40 MG/1
40 TABLET, FILM COATED ORAL NIGHTLY
COMMUNITY

## 2023-03-10 ENCOUNTER — HOSPITAL ENCOUNTER (OUTPATIENT)
Dept: MRI IMAGING | Age: 58
Discharge: HOME | End: 2023-03-10
Attending: Other
Payer: COMMERCIAL

## 2023-03-10 ENCOUNTER — LAB ENCOUNTER (OUTPATIENT)
Dept: LAB | Facility: HOSPITAL | Age: 58
End: 2023-03-10
Attending: Other
Payer: COMMERCIAL

## 2023-03-10 DIAGNOSIS — I63.131 CEREBROVASCULAR ACCIDENT (CVA) DUE TO EMBOLISM OF RIGHT CAROTID ARTERY (HCC): ICD-10-CM

## 2023-03-10 DIAGNOSIS — M79.10 MYALGIA: ICD-10-CM

## 2023-03-10 DIAGNOSIS — R53.82 CHRONIC FATIGUE: ICD-10-CM

## 2023-03-10 DIAGNOSIS — M79.10 MUSCLE PAIN: ICD-10-CM

## 2023-03-10 LAB
ASPIRIN PLT INHIBITION: 399 ARU (ref 620–672)
CK SERPL-CCNC: 95 U/L
CRP SERPL-MCNC: 0.52 MG/DL (ref ?–0.3)
ERYTHROCYTE [SEDIMENTATION RATE] IN BLOOD: 24 MM/HR
P2Y12 REACTION UNITS: 180 PRU
RHEUMATOID FACT SERPL-ACNC: <10 IU/ML (ref ?–15)

## 2023-03-10 PROCEDURE — 70549 MR ANGIOGRAPH NECK W/O&W/DYE: CPT | Performed by: OTHER

## 2023-03-10 PROCEDURE — 85576 BLOOD PLATELET AGGREGATION: CPT

## 2023-03-10 PROCEDURE — 86225 DNA ANTIBODY NATIVE: CPT

## 2023-03-10 PROCEDURE — 86147 CARDIOLIPIN ANTIBODY EA IG: CPT

## 2023-03-10 PROCEDURE — A9575 INJ GADOTERATE MEGLUMI 0.1ML: HCPCS

## 2023-03-10 PROCEDURE — 86431 RHEUMATOID FACTOR QUANT: CPT

## 2023-03-10 PROCEDURE — 70544 MR ANGIOGRAPHY HEAD W/O DYE: CPT | Performed by: OTHER

## 2023-03-10 PROCEDURE — 86038 ANTINUCLEAR ANTIBODIES: CPT

## 2023-03-10 PROCEDURE — 86140 C-REACTIVE PROTEIN: CPT

## 2023-03-10 PROCEDURE — 82550 ASSAY OF CK (CPK): CPT

## 2023-03-10 PROCEDURE — 85652 RBC SED RATE AUTOMATED: CPT

## 2023-03-10 PROCEDURE — 36415 COLL VENOUS BLD VENIPUNCTURE: CPT

## 2023-03-10 RX ORDER — GADOTERATE MEGLUMINE 376.9 MG/ML
20 INJECTION INTRAVENOUS
Status: COMPLETED | OUTPATIENT
Start: 2023-03-10 | End: 2023-03-10

## 2023-03-10 RX ADMIN — GADOTERATE MEGLUMINE 16 ML: 376.9 INJECTION INTRAVENOUS at 22:38:00

## 2023-03-13 LAB
CARDIOLIPIN IGG SERPL-ACNC: 1.5 GPL (ref ?–10)
CARDIOLIPIN IGM SERPL-ACNC: 1.7 MPL (ref ?–10)
DSDNA IGG SERPL IA-ACNC: 4 IU/ML
ENA AB SER QL IA: <0.09 UG/L
ENA AB SER QL IA: NEGATIVE

## 2023-04-20 ENCOUNTER — OFFICE VISIT (OUTPATIENT)
Dept: NEUROLOGY | Facility: CLINIC | Age: 58
End: 2023-04-20
Payer: COMMERCIAL

## 2023-04-20 VITALS
HEART RATE: 80 BPM | BODY MASS INDEX: 29.03 KG/M2 | RESPIRATION RATE: 16 BRPM | DIASTOLIC BLOOD PRESSURE: 73 MMHG | SYSTOLIC BLOOD PRESSURE: 116 MMHG | WEIGHT: 185 LBS | HEIGHT: 67 IN

## 2023-04-20 DIAGNOSIS — I63.131 CEREBROVASCULAR ACCIDENT (CVA) DUE TO EMBOLISM OF RIGHT CAROTID ARTERY (HCC): Primary | ICD-10-CM

## 2023-04-20 DIAGNOSIS — U09.9 LONG COVID: ICD-10-CM

## 2023-04-20 DIAGNOSIS — I67.9 SMALL VESSEL DISEASE, CEREBROVASCULAR: ICD-10-CM

## 2023-04-20 PROCEDURE — 99214 OFFICE O/P EST MOD 30 MIN: CPT | Performed by: OTHER

## 2023-04-20 PROCEDURE — 3008F BODY MASS INDEX DOCD: CPT | Performed by: OTHER

## 2023-04-20 PROCEDURE — 3078F DIAST BP <80 MM HG: CPT | Performed by: OTHER

## 2023-04-20 PROCEDURE — 3074F SYST BP LT 130 MM HG: CPT | Performed by: OTHER

## 2023-06-02 ENCOUNTER — HOSPITAL ENCOUNTER (OUTPATIENT)
Dept: CV DIAGNOSTICS | Facility: HOSPITAL | Age: 58
Discharge: HOME | End: 2023-06-02
Attending: Other
Payer: COMMERCIAL

## 2023-06-02 DIAGNOSIS — R53.82 CHRONIC FATIGUE: ICD-10-CM

## 2023-06-02 DIAGNOSIS — M79.10 MUSCLE PAIN: ICD-10-CM

## 2023-06-02 DIAGNOSIS — I63.131 CEREBROVASCULAR ACCIDENT (CVA) DUE TO EMBOLISM OF RIGHT CAROTID ARTERY (HCC): ICD-10-CM

## 2023-06-02 DIAGNOSIS — M79.10 MYALGIA: ICD-10-CM

## 2023-06-02 PROCEDURE — 93306 TTE W/DOPPLER COMPLETE: CPT | Performed by: OTHER

## 2023-07-26 ENCOUNTER — OFFICE VISIT (OUTPATIENT)
Dept: INTERNAL MEDICINE CLINIC | Facility: CLINIC | Age: 58
End: 2023-07-26
Payer: COMMERCIAL

## 2023-07-26 ENCOUNTER — LAB ENCOUNTER (OUTPATIENT)
Dept: LAB | Age: 58
End: 2023-07-26
Attending: INTERNAL MEDICINE
Payer: COMMERCIAL

## 2023-07-26 VITALS
BODY MASS INDEX: 28.88 KG/M2 | OXYGEN SATURATION: 98 % | WEIGHT: 184 LBS | DIASTOLIC BLOOD PRESSURE: 78 MMHG | HEART RATE: 68 BPM | TEMPERATURE: 98 F | SYSTOLIC BLOOD PRESSURE: 110 MMHG | RESPIRATION RATE: 18 BRPM | HEIGHT: 67 IN

## 2023-07-26 DIAGNOSIS — Z13.0 SCREENING FOR BLOOD DISEASE: ICD-10-CM

## 2023-07-26 DIAGNOSIS — Z13.29 THYROID DISORDER SCREENING: ICD-10-CM

## 2023-07-26 DIAGNOSIS — E11.9 TYPE 2 DIABETES MELLITUS WITHOUT COMPLICATION, WITHOUT LONG-TERM CURRENT USE OF INSULIN (HCC): ICD-10-CM

## 2023-07-26 DIAGNOSIS — Z00.00 LABORATORY EXAMINATION ORDERED AS PART OF A COMPLETE PHYSICAL EXAMINATION: ICD-10-CM

## 2023-07-26 DIAGNOSIS — I10 ESSENTIAL HYPERTENSION: Primary | ICD-10-CM

## 2023-07-26 DIAGNOSIS — E78.00 HYPERCHOLESTEROLEMIA: ICD-10-CM

## 2023-07-26 LAB
ALBUMIN SERPL-MCNC: 4.1 G/DL (ref 3.4–5)
ALBUMIN/GLOB SERPL: 1.2 {RATIO} (ref 1–2)
ALP LIVER SERPL-CCNC: 84 U/L
ALT SERPL-CCNC: 68 U/L
ANION GAP SERPL CALC-SCNC: 6 MMOL/L (ref 0–18)
AST SERPL-CCNC: 92 U/L (ref 15–37)
BASOPHILS # BLD AUTO: 0.01 X10(3) UL (ref 0–0.2)
BASOPHILS NFR BLD AUTO: 0.3 %
BILIRUB SERPL-MCNC: 0.7 MG/DL (ref 0.1–2)
BUN BLD-MCNC: 25 MG/DL (ref 7–18)
CALCIUM BLD-MCNC: 9.1 MG/DL (ref 8.5–10.1)
CHLORIDE SERPL-SCNC: 107 MMOL/L (ref 98–112)
CHOLEST SERPL-MCNC: 156 MG/DL (ref ?–200)
CO2 SERPL-SCNC: 24 MMOL/L (ref 21–32)
CREAT BLD-MCNC: 1.08 MG/DL
CREAT UR-SCNC: 127 MG/DL
EGFRCR SERPLBLD CKD-EPI 2021: 79 ML/MIN/1.73M2 (ref 60–?)
EOSINOPHIL # BLD AUTO: 0.09 X10(3) UL (ref 0–0.7)
EOSINOPHIL NFR BLD AUTO: 2.7 %
ERYTHROCYTE [DISTWIDTH] IN BLOOD BY AUTOMATED COUNT: 13.4 %
EST. AVERAGE GLUCOSE BLD GHB EST-MCNC: 166 MG/DL (ref 68–126)
FASTING PATIENT LIPID ANSWER: YES
FASTING STATUS PATIENT QL REPORTED: YES
GLOBULIN PLAS-MCNC: 3.5 G/DL (ref 2.8–4.4)
GLUCOSE BLD-MCNC: 114 MG/DL (ref 70–99)
HBA1C MFR BLD: 7.4 % (ref ?–5.7)
HCT VFR BLD AUTO: 44.8 %
HDLC SERPL-MCNC: 42 MG/DL (ref 40–59)
HGB BLD-MCNC: 14.9 G/DL
IMM GRANULOCYTES # BLD AUTO: 0.01 X10(3) UL (ref 0–1)
IMM GRANULOCYTES NFR BLD: 0.3 %
LDLC SERPL CALC-MCNC: 72 MG/DL (ref ?–100)
LYMPHOCYTES # BLD AUTO: 0.77 X10(3) UL (ref 1–4)
LYMPHOCYTES NFR BLD AUTO: 22.9 %
MCH RBC QN AUTO: 30.2 PG (ref 26–34)
MCHC RBC AUTO-ENTMCNC: 33.3 G/DL (ref 31–37)
MCV RBC AUTO: 90.9 FL
MICROALBUMIN UR-MCNC: 3.61 MG/DL
MICROALBUMIN/CREAT 24H UR-RTO: 28.4 UG/MG (ref ?–30)
MONOCYTES # BLD AUTO: 0.5 X10(3) UL (ref 0.1–1)
MONOCYTES NFR BLD AUTO: 14.9 %
NEUTROPHILS # BLD AUTO: 1.98 X10 (3) UL (ref 1.5–7.7)
NEUTROPHILS # BLD AUTO: 1.98 X10(3) UL (ref 1.5–7.7)
NEUTROPHILS NFR BLD AUTO: 58.9 %
NONHDLC SERPL-MCNC: 114 MG/DL (ref ?–130)
OSMOLALITY SERPL CALC.SUM OF ELEC: 289 MOSM/KG (ref 275–295)
PLATELET # BLD AUTO: 146 10(3)UL (ref 150–450)
POTASSIUM SERPL-SCNC: 4.1 MMOL/L (ref 3.5–5.1)
PROT SERPL-MCNC: 7.6 G/DL (ref 6.4–8.2)
RBC # BLD AUTO: 4.93 X10(6)UL
SODIUM SERPL-SCNC: 137 MMOL/L (ref 136–145)
TRIGL SERPL-MCNC: 257 MG/DL (ref 30–149)
TSI SER-ACNC: 1.76 MIU/ML (ref 0.36–3.74)
VLDLC SERPL CALC-MCNC: 40 MG/DL (ref 0–30)
WBC # BLD AUTO: 3.4 X10(3) UL (ref 4–11)

## 2023-07-26 PROCEDURE — 80050 GENERAL HEALTH PANEL: CPT | Performed by: INTERNAL MEDICINE

## 2023-07-26 PROCEDURE — 3008F BODY MASS INDEX DOCD: CPT | Performed by: INTERNAL MEDICINE

## 2023-07-26 PROCEDURE — 3074F SYST BP LT 130 MM HG: CPT | Performed by: INTERNAL MEDICINE

## 2023-07-26 PROCEDURE — 82043 UR ALBUMIN QUANTITATIVE: CPT | Performed by: INTERNAL MEDICINE

## 2023-07-26 PROCEDURE — 3078F DIAST BP <80 MM HG: CPT | Performed by: INTERNAL MEDICINE

## 2023-07-26 PROCEDURE — 82570 ASSAY OF URINE CREATININE: CPT | Performed by: INTERNAL MEDICINE

## 2023-07-26 PROCEDURE — 99214 OFFICE O/P EST MOD 30 MIN: CPT | Performed by: INTERNAL MEDICINE

## 2023-07-26 PROCEDURE — 80061 LIPID PANEL: CPT | Performed by: INTERNAL MEDICINE

## 2023-07-26 PROCEDURE — 83036 HEMOGLOBIN GLYCOSYLATED A1C: CPT | Performed by: INTERNAL MEDICINE

## 2023-07-26 RX ORDER — ZOLPIDEM TARTRATE 5 MG/1
5 TABLET ORAL NIGHTLY PRN
Qty: 30 TABLET | Refills: 0 | Status: SHIPPED | OUTPATIENT
Start: 2023-07-26

## 2023-07-27 RX ORDER — PANTOPRAZOLE SODIUM 40 MG/1
40 TABLET, DELAYED RELEASE ORAL DAILY
Qty: 90 TABLET | Refills: 0 | Status: SHIPPED | OUTPATIENT
Start: 2023-07-27

## 2023-07-27 NOTE — TELEPHONE ENCOUNTER
Requested Prescriptions     Pending Prescriptions Disp Refills    PANTOPRAZOLE 40 MG Oral Tab EC [Pharmacy Med Name: PANTOPRAZOLE SOD DR 40 MG TAB] 90 tablet 0     Sig: TAKE 1 TABLET BY MOUTH EVERY DAY       LOV: 7/26/23 A.D    LAST PHYSICAL: 7/26/23 A.D    LAST REFILL: pantoprazole-unknown-9/24/21    LAST LAB: 7/26/23    Your Appointments      Friday August 11, 2023 10:20 AM  Follow Up Visit with Keke Nunez MD  [address], [address] [place] [address] ([facility] [address] [facility] [address] [phone]   Contact your primary care provider if your insurance requires a referral.    Please arrive 15 minutes prior to your scheduled appointment. Be sure to bring your current Insurance card, Photo ID, and medication bottles or a list of your current medications. A 24 hour notice is required to cancel any appointment or you may be charged a $40 No Show Fee. Important: 24 hour notice is required to cancel any appointment or you may be charged a $40 No Show Fee. Please notify your physician office.

## 2023-08-11 ENCOUNTER — OFFICE VISIT (OUTPATIENT)
Dept: NEUROLOGY | Facility: CLINIC | Age: 58
End: 2023-08-11
Payer: COMMERCIAL

## 2023-08-11 VITALS
WEIGHT: 184 LBS | RESPIRATION RATE: 16 BRPM | HEIGHT: 67 IN | SYSTOLIC BLOOD PRESSURE: 132 MMHG | DIASTOLIC BLOOD PRESSURE: 70 MMHG | HEART RATE: 61 BPM | BODY MASS INDEX: 28.88 KG/M2

## 2023-08-11 DIAGNOSIS — U09.9 LONG COVID: ICD-10-CM

## 2023-08-11 DIAGNOSIS — G43.709 CHRONIC MIGRAINE W/O AURA W/O STATUS MIGRAINOSUS, NOT INTRACTABLE: ICD-10-CM

## 2023-08-11 DIAGNOSIS — G43.001 MIGRAINE WITHOUT AURA AND WITH STATUS MIGRAINOSUS, NOT INTRACTABLE: ICD-10-CM

## 2023-08-11 DIAGNOSIS — I63.131 CEREBROVASCULAR ACCIDENT (CVA) DUE TO EMBOLISM OF RIGHT CAROTID ARTERY (HCC): Primary | ICD-10-CM

## 2023-08-11 PROCEDURE — 3075F SYST BP GE 130 - 139MM HG: CPT | Performed by: OTHER

## 2023-08-11 PROCEDURE — 3008F BODY MASS INDEX DOCD: CPT | Performed by: OTHER

## 2023-08-11 PROCEDURE — 99214 OFFICE O/P EST MOD 30 MIN: CPT | Performed by: OTHER

## 2023-08-11 PROCEDURE — 3078F DIAST BP <80 MM HG: CPT | Performed by: OTHER

## 2023-08-11 RX ORDER — RIMEGEPANT SULFATE 75 MG/75MG
75 TABLET, ORALLY DISINTEGRATING ORAL AS NEEDED
Qty: 8 TABLET | Refills: 3 | Status: SHIPPED | OUTPATIENT
Start: 2023-08-11 | End: 2024-08-10

## 2023-08-11 RX ORDER — DIVALPROEX SODIUM 250 MG/1
250 TABLET, EXTENDED RELEASE ORAL DAILY
Qty: 30 TABLET | Refills: 5 | Status: SHIPPED | OUTPATIENT
Start: 2023-08-11

## 2023-08-14 ENCOUNTER — PATIENT MESSAGE (OUTPATIENT)
Dept: NEUROLOGY | Facility: CLINIC | Age: 58
End: 2023-08-14

## 2023-08-14 NOTE — TELEPHONE ENCOUNTER
From: Shoaib Guy  To: Yomaira Lucas MD  Sent: 8/14/2023 10:11 AM CDT  Subject: Nurtec    Looks like my insurance does not cover Nurtec. Is there a generic alternative?

## 2023-08-15 NOTE — TELEPHONE ENCOUNTER
PA requested for [facility]  Clinical questions answered and submitted to insurance  Awaiting coverage determination

## 2023-09-11 NOTE — TELEPHONE ENCOUNTER
Received fax from [employer]   Approval received for patient use of Nurtec   Approval granted: through 12/11/23        Pt notified

## 2023-09-12 ENCOUNTER — TELEPHONE (OUTPATIENT)
Dept: NEUROLOGY | Facility: CLINIC | Age: 58
End: 2023-09-12

## 2023-09-12 DIAGNOSIS — G43.001 MIGRAINE WITHOUT AURA AND WITH STATUS MIGRAINOSUS, NOT INTRACTABLE: ICD-10-CM

## 2023-09-13 RX ORDER — DIVALPROEX SODIUM 500 MG/1
500 TABLET, EXTENDED RELEASE ORAL DAILY
COMMUNITY
Start: 2023-09-13

## 2023-11-03 ENCOUNTER — OFFICE VISIT (OUTPATIENT)
Dept: INTERNAL MEDICINE CLINIC | Facility: CLINIC | Age: 58
End: 2023-11-03
Payer: COMMERCIAL

## 2023-11-03 VITALS
DIASTOLIC BLOOD PRESSURE: 86 MMHG | BODY MASS INDEX: 28.94 KG/M2 | HEIGHT: 67 IN | SYSTOLIC BLOOD PRESSURE: 136 MMHG | OXYGEN SATURATION: 98 % | HEART RATE: 80 BPM | RESPIRATION RATE: 17 BRPM | WEIGHT: 184.38 LBS

## 2023-11-03 DIAGNOSIS — Z00.00 ROUTINE GENERAL MEDICAL EXAMINATION AT A HEALTH CARE FACILITY: Primary | ICD-10-CM

## 2023-11-03 DIAGNOSIS — E11.9 TYPE 2 DIABETES MELLITUS WITHOUT COMPLICATION, WITHOUT LONG-TERM CURRENT USE OF INSULIN (HCC): ICD-10-CM

## 2023-11-03 DIAGNOSIS — M51.26 HNP (HERNIATED NUCLEUS PULPOSUS), LUMBAR: ICD-10-CM

## 2023-11-03 DIAGNOSIS — I10 ESSENTIAL HYPERTENSION: ICD-10-CM

## 2023-11-03 DIAGNOSIS — E78.00 PURE HYPERCHOLESTEROLEMIA: ICD-10-CM

## 2023-11-03 DIAGNOSIS — Z86.73 HISTORY OF CVA (CEREBROVASCULAR ACCIDENT): ICD-10-CM

## 2023-11-03 DIAGNOSIS — I25.10 CORONARY ARTERY DISEASE INVOLVING NATIVE CORONARY ARTERY OF NATIVE HEART WITHOUT ANGINA PECTORIS: ICD-10-CM

## 2023-11-03 PROBLEM — R09.81 NASAL CONGESTION: Status: RESOLVED | Noted: 2021-04-08 | Resolved: 2023-11-03

## 2023-11-03 PROBLEM — J32.4 CHRONIC PANSINUSITIS: Status: RESOLVED | Noted: 2021-04-08 | Resolved: 2023-11-03

## 2023-11-03 PROBLEM — R73.02 GLUCOSE INTOLERANCE (IMPAIRED GLUCOSE TOLERANCE): Status: RESOLVED | Noted: 2017-12-11 | Resolved: 2023-11-03
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
## 2023-11-03 PROBLEM — M54.16 LEFT LUMBAR RADICULITIS: Status: RESOLVED | Noted: 2019-12-19 | Resolved: 2023-11-03

## 2023-11-03 PROBLEM — M54.16 LUMBAR RADICULOPATHY: Status: RESOLVED | Noted: 2019-12-09 | Resolved: 2023-11-03

## 2023-11-03 PROCEDURE — 90472 IMMUNIZATION ADMIN EACH ADD: CPT | Performed by: INTERNAL MEDICINE

## 2023-11-03 PROCEDURE — 99396 PREV VISIT EST AGE 40-64: CPT | Performed by: INTERNAL MEDICINE

## 2023-11-03 PROCEDURE — 3075F SYST BP GE 130 - 139MM HG: CPT | Performed by: INTERNAL MEDICINE

## 2023-11-03 PROCEDURE — 90715 TDAP VACCINE 7 YRS/> IM: CPT | Performed by: INTERNAL MEDICINE

## 2023-11-03 PROCEDURE — 3079F DIAST BP 80-89 MM HG: CPT | Performed by: INTERNAL MEDICINE

## 2023-11-03 PROCEDURE — 3008F BODY MASS INDEX DOCD: CPT | Performed by: INTERNAL MEDICINE

## 2023-11-03 PROCEDURE — 90677 PCV20 VACCINE IM: CPT | Performed by: INTERNAL MEDICINE

## 2023-11-03 PROCEDURE — 90471 IMMUNIZATION ADMIN: CPT | Performed by: INTERNAL MEDICINE

## 2023-11-03 NOTE — TELEPHONE ENCOUNTER
Requested Prescriptions     Pending Prescriptions Disp Refills    atorvastatin 40 MG Oral Tab 90 tablet 0     Sig: Take 1 tablet (40 mg total) by mouth nightly. zolpidem (AMBIEN) 5 MG Oral Tab 30 tablet 0     Sig: Take 1 tablet (5 mg total) by mouth nightly as needed for Sleep.    pantoprazole 40 MG Oral Tab EC 90 tablet 0     Sig: Take 1 tablet (40 mg total) by mouth daily.        LOV: 11/3/23    LAST PHYSICAL: 11/3/23    LAST REFILL: ambien-30-7/26/23  Qsgrbtmrbydqi-07-7/27/23  Atorvastatin-unknown    LAST LAB: 7/26/23    Your Appointments      Monday February 12, 2024  9:00 AM  Follow Up Visit with Junaid Plummer MD  [address], [address] [place] [address] (Nicolas [facility]) [address] [facility] [id number] [phone]

## 2023-11-05 RX ORDER — ZOLPIDEM TARTRATE 5 MG/1
5 TABLET ORAL NIGHTLY PRN
Qty: 30 TABLET | Refills: 0 | Status: SHIPPED | OUTPATIENT
Start: 2023-11-05

## 2023-11-05 RX ORDER — PANTOPRAZOLE SODIUM 40 MG/1
40 TABLET, DELAYED RELEASE ORAL DAILY
Qty: 90 TABLET | Refills: 0 | Status: SHIPPED | OUTPATIENT
Start: 2023-11-05

## 2023-11-05 RX ORDER — ATORVASTATIN CALCIUM 40 MG/1
40 TABLET, FILM COATED ORAL NIGHTLY
Qty: 90 TABLET | Refills: 0 | Status: SHIPPED | OUTPATIENT
Start: 2023-11-05

## 2023-11-08 ENCOUNTER — PATIENT MESSAGE (OUTPATIENT)
Dept: INTERNAL MEDICINE CLINIC | Facility: CLINIC | Age: 58
End: 2023-11-08

## 2023-11-08 NOTE — TELEPHONE ENCOUNTER
Patient needs to fill our records release form in order to receive eye exam from [facility] in [facility] sent

## 2023-11-13 ENCOUNTER — MED REC SCAN ONLY (OUTPATIENT)
Dept: INTERNAL MEDICINE CLINIC | Facility: CLINIC | Age: 58
End: 2023-11-13

## 2023-11-13 NOTE — TELEPHONE ENCOUNTER
Pt came in to the office to fill out an MARGARET for us to request records.  [facility]#[phone]  ph# [phone] MARGARET sent to scanning

## 2023-11-16 ENCOUNTER — MED REC SCAN ONLY (OUTPATIENT)
Dept: INTERNAL MEDICINE CLINIC | Facility: CLINIC | Age: 58
End: 2023-11-16

## 2023-12-01 ENCOUNTER — LAB ENCOUNTER (OUTPATIENT)
Dept: LAB | Facility: HOSPITAL | Age: 58
End: 2023-12-01
Attending: INTERNAL MEDICINE
Payer: COMMERCIAL

## 2023-12-01 DIAGNOSIS — E11.9 TYPE 2 DIABETES MELLITUS WITHOUT COMPLICATION, WITHOUT LONG-TERM CURRENT USE OF INSULIN (HCC): ICD-10-CM

## 2023-12-01 LAB
ALBUMIN SERPL-MCNC: 4 G/DL (ref 3.4–5)
ALBUMIN/GLOB SERPL: 1.1 {RATIO} (ref 1–2)
ALP LIVER SERPL-CCNC: 88 U/L
ALT SERPL-CCNC: 51 U/L
ANION GAP SERPL CALC-SCNC: 2 MMOL/L (ref 0–18)
AST SERPL-CCNC: 74 U/L (ref 15–37)
BILIRUB SERPL-MCNC: 0.7 MG/DL (ref 0.1–2)
BUN BLD-MCNC: 17 MG/DL (ref 9–23)
CALCIUM BLD-MCNC: 9.2 MG/DL (ref 8.5–10.1)
CHLORIDE SERPL-SCNC: 105 MMOL/L (ref 98–112)
CHOLEST SERPL-MCNC: 105 MG/DL (ref ?–200)
CO2 SERPL-SCNC: 29 MMOL/L (ref 21–32)
CREAT BLD-MCNC: 1 MG/DL
EGFRCR SERPLBLD CKD-EPI 2021: 86 ML/MIN/1.73M2 (ref 60–?)
EST. AVERAGE GLUCOSE BLD GHB EST-MCNC: 166 MG/DL (ref 68–126)
FASTING PATIENT LIPID ANSWER: YES
FASTING STATUS PATIENT QL REPORTED: YES
GLOBULIN PLAS-MCNC: 3.7 G/DL (ref 2.8–4.4)
GLUCOSE BLD-MCNC: 116 MG/DL (ref 70–99)
HBA1C MFR BLD: 7.4 % (ref ?–5.7)
HDLC SERPL-MCNC: 37 MG/DL (ref 40–59)
LDLC SERPL CALC-MCNC: 42 MG/DL (ref ?–100)
NONHDLC SERPL-MCNC: 68 MG/DL (ref ?–130)
OSMOLALITY SERPL CALC.SUM OF ELEC: 285 MOSM/KG (ref 275–295)
POTASSIUM SERPL-SCNC: 4.4 MMOL/L (ref 3.5–5.1)
PROT SERPL-MCNC: 7.7 G/DL (ref 6.4–8.2)
SODIUM SERPL-SCNC: 136 MMOL/L (ref 136–145)
TRIGL SERPL-MCNC: 152 MG/DL (ref 30–149)
VLDLC SERPL CALC-MCNC: 21 MG/DL (ref 0–30)

## 2023-12-01 PROCEDURE — 80053 COMPREHEN METABOLIC PANEL: CPT

## 2023-12-01 PROCEDURE — 83036 HEMOGLOBIN GLYCOSYLATED A1C: CPT

## 2023-12-01 PROCEDURE — 36415 COLL VENOUS BLD VENIPUNCTURE: CPT

## 2023-12-01 PROCEDURE — 80061 LIPID PANEL: CPT

## 2023-12-04 DIAGNOSIS — E11.9 TYPE 2 DIABETES MELLITUS WITHOUT COMPLICATION, WITHOUT LONG-TERM CURRENT USE OF INSULIN (HCC): Primary | ICD-10-CM

## 2023-12-04 DIAGNOSIS — E78.00 PURE HYPERCHOLESTEROLEMIA: ICD-10-CM

## 2023-12-04 DIAGNOSIS — I10 ESSENTIAL HYPERTENSION: ICD-10-CM

## 2023-12-27 RX ORDER — ZOLPIDEM TARTRATE 5 MG/1
5 TABLET ORAL NIGHTLY PRN
Qty: 30 TABLET | Refills: 0 | Status: SHIPPED | OUTPATIENT
Start: 2023-12-27

## 2024-02-08 ENCOUNTER — OFFICE VISIT (OUTPATIENT)
Dept: INTERNAL MEDICINE CLINIC | Facility: CLINIC | Age: 59
End: 2024-02-08
Payer: COMMERCIAL

## 2024-02-08 VITALS
TEMPERATURE: 97 F | HEIGHT: 68 IN | WEIGHT: 183.81 LBS | SYSTOLIC BLOOD PRESSURE: 112 MMHG | OXYGEN SATURATION: 96 % | DIASTOLIC BLOOD PRESSURE: 68 MMHG | BODY MASS INDEX: 27.86 KG/M2 | RESPIRATION RATE: 16 BRPM | HEART RATE: 64 BPM

## 2024-02-08 DIAGNOSIS — R51.9 CHRONIC NONINTRACTABLE HEADACHE, UNSPECIFIED HEADACHE TYPE: ICD-10-CM

## 2024-02-08 DIAGNOSIS — I65.09 VERTEBRAL ARTERY STENOSIS, UNSPECIFIED LATERALITY: ICD-10-CM

## 2024-02-08 DIAGNOSIS — G89.29 CHRONIC LEFT SHOULDER PAIN: Primary | ICD-10-CM

## 2024-02-08 DIAGNOSIS — M75.82 TENDONITIS OF LEFT ROTATOR CUFF: ICD-10-CM

## 2024-02-08 DIAGNOSIS — G89.29 CHRONIC NONINTRACTABLE HEADACHE, UNSPECIFIED HEADACHE TYPE: ICD-10-CM

## 2024-02-08 DIAGNOSIS — M25.512 CHRONIC LEFT SHOULDER PAIN: Primary | ICD-10-CM

## 2024-02-08 PROCEDURE — 99215 OFFICE O/P EST HI 40 MIN: CPT | Performed by: INTERNAL MEDICINE

## 2024-02-08 PROCEDURE — 3008F BODY MASS INDEX DOCD: CPT | Performed by: INTERNAL MEDICINE

## 2024-02-08 PROCEDURE — 3074F SYST BP LT 130 MM HG: CPT | Performed by: INTERNAL MEDICINE

## 2024-02-08 PROCEDURE — 3078F DIAST BP <80 MM HG: CPT | Performed by: INTERNAL MEDICINE

## 2024-02-08 RX ORDER — SUMATRIPTAN 50 MG/1
50 TABLET, FILM COATED ORAL EVERY 2 HOUR PRN
Qty: 20 TABLET | Refills: 0 | Status: SHIPPED | OUTPATIENT
Start: 2024-02-08 | End: 2024-02-09

## 2024-02-08 RX ORDER — OMEPRAZOLE 40 MG/1
40 CAPSULE, DELAYED RELEASE ORAL DAILY
Qty: 90 CAPSULE | Refills: 3 | Status: SHIPPED | OUTPATIENT
Start: 2024-02-08 | End: 2025-02-02

## 2024-02-08 NOTE — PROGRESS NOTES
Javad Bang  4/8/1965    Chief Complaint   Patient presents with    Follow - Up     Rm 1       SUBJECTIVE   Javad Bang is a 58 year old male who presents as a follow-up.    Following COVID19 infection 18 months ago the patient has been experiencing a recurrent headache, described as a bilateral pressure-like sensation not responsive to Nurtec or Depakote. OTC NSAID therapy offers improvement, however temporary. For a suspected medication overuse headache NSAID therapy was reduced from 2 tablets TID to 2 tablets daily without improvement. He has undergone evaluation by theneurology service. He reports that his symptoms most consistently occur between 12-3 pm on a daily basis, without identified trigger.     Furthermore, the patient reports a several-month duration of a left shoulder pain that has remained persistent. His symptoms do interfere with particular maneuvers and lifting.    Review of Systems   No f/c/chest pain or sob. No cough. No abd pain/n/v/d. No ha or dizziness. No numbness, tingling, or weakness. No other complaints today.    Current Outpatient Medications   Medication Sig Dispense Refill    Omeprazole 40 MG Oral Capsule Delayed Release Take 1 capsule (40 mg total) by mouth daily. 90 capsule 3    zolpidem (AMBIEN) 5 MG Oral Tab Take 1 tablet (5 mg total) by mouth nightly as needed for Sleep. 30 tablet 0    atorvastatin 40 MG Oral Tab Take 1 tablet (40 mg total) by mouth nightly. 90 tablet 0    metFORMIN HCl 1000 MG Oral Tab Take 1 tablet (1,000 mg total) by mouth 2 (two) times daily with meals.  0    clopidogrel 75 MG Oral Tab Take 1 tablet (75 mg total) by mouth daily. 90 tablet 3    METOPROLOL TARTRATE 25 MG Oral Tab TAKE 0.5 TABLETS (12.5 MG TOTAL) BY MOUTH 2 (TWO) TIMES DAILY. 90 tablet 2    Empagliflozin (JARDIANCE) 25 MG Oral Tab Take 25 mg by mouth daily.      Multiple Vitamins-Minerals (CENTRUM ADULTS OR) Take by mouth daily.      aspirin 81 MG Oral Tab Take 1 tablet (81 mg total) by  mouth daily.      Benazepril HCl 20 MG Oral Tab Take 1 tablet (20 mg total) by mouth 2 (two) times daily. Once in morning, once in evening.      divalproex ER (DEPAKOTE ER) 500 MG Oral Tablet 24 Hr Take 1 tablet (500 mg total) by mouth daily. (Patient not taking: Reported on 11/3/2023)      Rimegepant Sulfate (NURTEC) 75 MG Oral Tablet Dispersible Take 75 mg by mouth as needed. Take one tablet at onset of migraine.  Maximum dose in 24 hours is 1 tablet (75mg). (Patient not taking: Reported on 11/3/2023) 8 tablet 3    FLUTICASONE PROPIONATE 50 MCG/ACT Nasal Suspension SPRAY 2 SPRAYS INTO EACH NOSTRIL EVERY DAY (Patient not taking: Reported on 11/3/2023) 48 mL 0      No Known Allergies   Past Medical History:   Diagnosis Date    Back problem     DIABETES     Diabetes (HCC)     Diverticulitis 2/16    Diverticulosis of large intestine     Esophageal reflux     High blood pressure     High cholesterol     HYPERLIPIDEMIA     KIDNEY STONE     Muscle weakness     left leg weakness    SLEEP APNEA     dx 6 months ago but patient doubts his dx    Swine flu     Visual impairment     glasses      Patient Active Problem List   Diagnosis    Type 2 diabetes mellitus without complication, without long-term current use of insulin (HCC)    Coronary artery disease involving native coronary artery of native heart without angina pectoris    Pure hypercholesterolemia    Essential hypertension    HNP (herniated nucleus pulposus), lumbar    History of CVA (cerebrovascular accident)      Past Surgical History:   Procedure Laterality Date    ANGIOPLASTY (CORONARY)  10/17/16    stent circumflex    APPENDECTOMY      COLONOSCOPY  4/7/16= Diverticulosis    Repeat 2026    COLONOSCOPY,DIAGNOSTIC N/A 4/7/2016    Procedure: COLONOSCOPY, POSSIBLE BIOPSY, POSSIBLE POLYPECTOMY 61033;  Surgeon: Noel Hamilton MD;  Location: [facility], [facility]    CYSTOSCOPY,INSERT URETERAL STENT      remove kidney stone    OTHER SURGICAL HISTORY  2008    kidney  stone removed    TONSILLECTOMY        Social History     Socioeconomic History    Marital status:    Tobacco Use    Smoking status: Former     Types: Cigarettes     Quit date: 1993     Years since quittin.8     Passive exposure: Never    Smokeless tobacco: Never   Vaping Use    Vaping Use: Never used   Substance and Sexual Activity    Alcohol use: Yes     Alcohol/week: 2.0 standard drinks of alcohol     Types: 2 Glasses of wine per week     Comment: socially    Drug use: Never   Other Topics Concern    Caffeine Concern Yes     Comment: 1 cup a day    Exercise Yes     Comment: 3 x week         OBJECTIVE:   /68 (BP Location: Left arm, Patient Position: Sitting, Cuff Size: adult)   Pulse 64   Temp 97 °F (36.1 °C) (Skin)   Resp 16   Ht 5' 8\" (1.727 m)   Wt 183 lb 12.8 oz (83.4 kg)   SpO2 96%   BMI 27.95 kg/m²   Constitutional: Oriented to person, place, and time. No distress.   HEENT:  Normocephalic and atraumatic.TM's wnl.  Nose normal. Oropharynx is clear and moist.   Eyes: Conjunctivae wnl.   Neck: Normal range of motion. Neck supple. Normal carotid pulses. No masses.  Cardiovascular: Normal rate, regular rhythm and intact distal pulses.  No murmur, rubs or gallops.   Pulmonary/Chest: Effort normal and breath sounds normal. No respiratory distress.  Abdominal: Soft. Bowel sounds are normal. Non tender, no masses, no organomegaly or hernias.  Musculoskeletal: No edema  Lymphadenopathy: No cervical adenopathy.   Neurological: No defecits  Skin: Skin is warm and dry. No rash.  Psychiatric: Normal mood and affect.     ASSESSMENT AND PLAN:   Javad Bang is a 58 year old male who presents as a follow-up.    Outstanding screening and preventive measures:  None    Chronic left shoulder pain:  Secondary to rotator tendonitis  Begin evaluation by xray  Referred to ortho service for further evaluation and management    Chronic headaches:  Post-covid onset 18 months ago  Suspected tension-type vs  medication overdue   Post evaluation by neurology service: refractory to use of depakote and Nurtec  Hold NSAID therapy. Trial of triptan therapy.   Trial of holding atorvastatin   No dietary or positional or situational triggers  Feels consistently well when awakes; unlikely hypoxia-induced/RONNY  May use heat, however, no cervical paraspinal or trapezius muscle spasm identified    Total time spent: 40 minutes    The patient indicates understanding of these issues and agrees to the plan.  TODAY'S ORDERS     No orders of the defined types were placed in this encounter.      Meds & Refills:  Requested Prescriptions     Signed Prescriptions Disp Refills    Omeprazole 40 MG Oral Capsule Delayed Release 90 capsule 3     Sig: Take 1 capsule (40 mg total) by mouth daily.       Imaging & Consults:  ORTHOPEDIC - INTERNAL  XR SHOULDER, COMPLETE (MIN 2 VIEWS), LEFT (CPT=73030)    No follow-ups on file.  There are no Patient Instructions on file for this visit.    All questions were answered and the patient agrees with the plan.     Thank you,  Tanmay Nowak MD

## 2024-02-09 ENCOUNTER — TELEPHONE (OUTPATIENT)
Dept: INTERNAL MEDICINE CLINIC | Facility: CLINIC | Age: 59
End: 2024-02-09

## 2024-02-09 ENCOUNTER — TELEPHONE (OUTPATIENT)
Dept: ORTHOPEDICS CLINIC | Facility: CLINIC | Age: 59
End: 2024-02-09

## 2024-02-09 DIAGNOSIS — M25.512 LEFT SHOULDER PAIN, UNSPECIFIED CHRONICITY: Primary | ICD-10-CM

## 2024-02-09 NOTE — TELEPHONE ENCOUNTER
Received fax from [facility] requesting script clarification for medication Sumatriptan 50mg. Fax placed in nurse holding bin.     \"Pharmacy comment: Script clarification : MD Please advise. Does pt have a history of ischemic coronary heart disease? Medication not recommended if pt has this diagnosis. Does MD want to change at this time or fill as is?\"     AD - Please advise if you want to cont with this med or change.

## 2024-02-09 NOTE — TELEPHONE ENCOUNTER
Appreciate the input; I have discontinued sumatriptan. Do not fill. Scheduled with neurology service on Monday; await recommendations for management.

## 2024-02-12 ENCOUNTER — OFFICE VISIT (OUTPATIENT)
Dept: NEUROLOGY | Facility: CLINIC | Age: 59
End: 2024-02-12
Payer: COMMERCIAL

## 2024-02-12 ENCOUNTER — OFFICE VISIT (OUTPATIENT)
Dept: ORTHOPEDICS CLINIC | Facility: CLINIC | Age: 59
End: 2024-02-12
Payer: COMMERCIAL

## 2024-02-12 ENCOUNTER — HOSPITAL ENCOUNTER (OUTPATIENT)
Dept: GENERAL RADIOLOGY | Age: 59
Discharge: HOME | End: 2024-02-12
Attending: ORTHOPAEDIC SURGERY
Payer: COMMERCIAL

## 2024-02-12 VITALS — WEIGHT: 180 LBS | HEIGHT: 67 IN | BODY MASS INDEX: 28.25 KG/M2

## 2024-02-12 VITALS
HEIGHT: 67 IN | SYSTOLIC BLOOD PRESSURE: 124 MMHG | HEART RATE: 72 BPM | BODY MASS INDEX: 29 KG/M2 | DIASTOLIC BLOOD PRESSURE: 76 MMHG

## 2024-02-12 DIAGNOSIS — M75.02 ADHESIVE CAPSULITIS OF LEFT SHOULDER: Primary | ICD-10-CM

## 2024-02-12 DIAGNOSIS — G44.209 TENSION HEADACHE: ICD-10-CM

## 2024-02-12 DIAGNOSIS — I67.9 SMALL VESSEL DISEASE, CEREBROVASCULAR: ICD-10-CM

## 2024-02-12 DIAGNOSIS — R53.82 CHRONIC FATIGUE: ICD-10-CM

## 2024-02-12 DIAGNOSIS — G43.001 MIGRAINE WITHOUT AURA AND WITH STATUS MIGRAINOSUS, NOT INTRACTABLE: Primary | ICD-10-CM

## 2024-02-12 DIAGNOSIS — M25.512 LEFT SHOULDER PAIN, UNSPECIFIED CHRONICITY: ICD-10-CM

## 2024-02-12 PROCEDURE — 3078F DIAST BP <80 MM HG: CPT | Performed by: OTHER

## 2024-02-12 PROCEDURE — 73030 X-RAY EXAM OF SHOULDER: CPT | Performed by: ORTHOPAEDIC SURGERY

## 2024-02-12 PROCEDURE — 99214 OFFICE O/P EST MOD 30 MIN: CPT | Performed by: OTHER

## 2024-02-12 PROCEDURE — 3074F SYST BP LT 130 MM HG: CPT | Performed by: OTHER

## 2024-02-12 RX ORDER — NORTRIPTYLINE HYDROCHLORIDE 10 MG/1
10 CAPSULE ORAL NIGHTLY
Qty: 30 CAPSULE | Refills: 5 | Status: SHIPPED | OUTPATIENT
Start: 2024-02-12

## 2024-02-12 RX ORDER — TRIAMCINOLONE ACETONIDE 40 MG/ML
40 INJECTION, SUSPENSION INTRA-ARTICULAR; INTRAMUSCULAR ONCE
Status: SHIPPED | OUTPATIENT
Start: 2024-02-12

## 2024-02-12 RX ORDER — KETOROLAC TROMETHAMINE 30 MG/ML
30 INJECTION, SOLUTION INTRAMUSCULAR; INTRAVENOUS ONCE
Status: SHIPPED | OUTPATIENT
Start: 2024-02-12 | End: 2024-02-13

## 2024-02-12 NOTE — PROGRESS NOTES
NEUROLOGY  [facility]    Javad Bang  4/8/1965  Primary Care Provider:  Tanmay Nowak MD    2/12/2024  Accompanied visit:     (x) No.      58 year old yo,  was last seen on:: OCt    Seen for:  Sharp pains in head, random, like cluster or TAC so was put on NURTEC and Depakote    Previous visit and existing record notes reviewed in preparation for the face to face visit.  Relevant labs and studies reviewed and will be noted in relevant areas of this record.      Present condition:  Patient stopped taking Depakote after a month and a half because it did not make any difference.  Nurtec also gave him side effects so only tried it once.  The shooting pains that were random in his head were never responsive to anything until eventually they have subsided on their own but is replaced by a daily headache that is more frontal and pressure-like oftentimes starting around noon time but this responds to 400 mg ibuprofen.  This is however a daily event    Denies neck pains    Past History update/new problem(s): as above    Review of Systems:  Review of Systems:  Denies systemic symptoms     No CP or SOB.  No GI or  symptoms. Relevant Neuro as noted above.      Medications:      Current Outpatient Medications:     nortriptyline 10 MG Oral Cap, Take 1 capsule (10 mg total) by mouth nightly., Disp: 30 capsule, Rfl: 5    Omeprazole 40 MG Oral Capsule Delayed Release, Take 1 capsule (40 mg total) by mouth daily., Disp: 90 capsule, Rfl: 3    zolpidem (AMBIEN) 5 MG Oral Tab, Take 1 tablet (5 mg total) by mouth nightly as needed for Sleep., Disp: 30 tablet, Rfl: 0    atorvastatin 40 MG Oral Tab, Take 1 tablet (40 mg total) by mouth nightly., Disp: 90 tablet, Rfl: 0    metFORMIN HCl 1000 MG Oral Tab, Take 1 tablet (1,000 mg total) by mouth 2 (two) times daily with meals., Disp: , Rfl: 0    clopidogrel 75 MG Oral Tab, Take 1 tablet (75 mg total) by mouth daily., Disp: 90  tablet, Rfl: 3    METOPROLOL TARTRATE 25 MG Oral Tab, TAKE 0.5 TABLETS (12.5 MG TOTAL) BY MOUTH 2 (TWO) TIMES DAILY., Disp: 90 tablet, Rfl: 2    Empagliflozin (JARDIANCE) 25 MG Oral Tab, Take 25 mg by mouth daily., Disp: , Rfl:     Multiple Vitamins-Minerals (CENTRUM ADULTS OR), Take by mouth daily., Disp: , Rfl:     aspirin 81 MG Oral Tab, Take 1 tablet (81 mg total) by mouth daily., Disp: , Rfl:     Benazepril HCl 20 MG Oral Tab, Take 1 tablet (20 mg total) by mouth 2 (two) times daily. Once in morning, once in evening., Disp: , Rfl:     divalproex ER (DEPAKOTE ER) 500 MG Oral Tablet 24 Hr, Take 1 tablet (500 mg total) by mouth daily. (Patient not taking: Reported on 11/3/2023), Disp: , Rfl:     FLUTICASONE PROPIONATE 50 MCG/ACT Nasal Suspension, SPRAY 2 SPRAYS INTO EACH NOSTRIL EVERY DAY (Patient not taking: Reported on 11/3/2023), Disp: 48 mL, Rfl: 0  PRN:     Allergies:  No Known Allergies       EXAM:  /76   Pulse 72   Ht 67\"   BMI 28.79 kg/m²   Looks stated age  General Exam:  HENT:  pink conjunctiva anicteric sclerae  Neck no adenopathy, thyroid normal  Heart and Lungs:  normal  Extremities: no cyanosis, skin changes    NEURO  Nonfocal examination      INTERPRETATION of RELEVANT LABS and other DATA:          Problem/s Identified this visit:   1. Migraine without aura and with status migrainosus, not intractable    2. Small vessel disease, cerebrovascular    3. Chronic fatigue    4. Tension headache          Discussion plus Diagnostics & Treatment Orders:  The frontal headache has a character of a tension type muscle contraction headache.  Will continue with the ibuprofen use and start him on nortriptyline 10 mg at bedtime.  Incidentally he has trouble sleeping so this might help as well.      (x) Discussed potential side effects of any treatment relevant to above.  Includes explanation of tests as necessary.    Return in about 6 months (around 8/12/2024).      Patient understands that if needed,  based on condition and or test results, follow up will be readjusted      Ben Jacobo MD  Vascular & General Neurology  Director, Multiple Sclerosis Program  [facility]  2/12/2024, Time completed 9:29 AM    Decision making:  ( x ) labs reviewed/ordered - 1  (  ) new diagnosis: - 1  ( x) Images & studies independently reviewed -non F2F  (  ) Case/studies discussed with other caregivers - -non F2F  (  ) Telephone time with patiern or authorized Fam member--non F2F  ( x ) other records reviewed --non F2F including consultations  (  ) [facility] meetings - patient not present --non F2F  (  ) Independent Historian obtained    Non Face to Face CPT code 65260/49945 applies as documented above    PROCEDURE DONE     (   ) see notes        After visit, patient was escorted out and handed-off discharge process and instructions to the check out desk.  No additional issues relevant to visit were raised to staff at this time interval.        This document is to be interpreted as my current opinion regarding the case as of the stated date of service based on the information available to me at this time and may supersedes any prior opinion expressed either orally or in writing.  Services rendered are only within the scope of direct medical care  Sometimes, reports may have been prepared partially using a speech recognition software technology.  If a word or phrase is confusing or out of context, please do not hesitate to call for clarification.

## 2024-02-12 NOTE — PATIENT INSTRUCTIONS
Refill policies:    Allow 2-3 business days for refills; controlled substances may take longer.  Contact your pharmacy at least 5 days prior to running out of medication and have them send an electronic request or submit request through the “request refill” option in your [employer] account.  Refills are not addressed on weekends; covering physicians do not authorize routine medications on weekends.  No narcotics or controlled substances are refilled after noon on Fridays or by on call physicians.  By law, narcotics must be electronically prescribed.  A 30 day supply with no refills is the maximum allowed.  If your prescription is due for a refill, you may be due for a follow up appointment.  To best provide you care, patients receiving routine medications need to be seen at least once a year.  Patients receiving narcotic/controlled substance medications need to be seen at least once every 3 months.  In the event that your preferred pharmacy does not have the requested medication in stock (e.g. Backordered), it is your responsibility to find another pharmacy that has the requested medication available.  We will gladly send a new prescription to that pharmacy at your request.    Scheduling Tests:    If your physician has ordered radiology tests such as MRI or CT scans, please contact Central Scheduling at [phone] right away to schedule the test.  Once scheduled, the [facility] Centralized Referral Team will work with your insurance carrier to obtain pre-certification or prior authorization.  Depending on your insurance carrier, approval may take 3-10 days.  It is highly recommended patients assure they have received an authorization before having a test performed.  If test is done without insurance authorization, patient may be responsible for the entire amount billed.      Precertification and Prior Authorizations:  If your physician has recommended that you have a procedure or additional testing performed the [facility]  Centralized Referral Team will contact your insurance carrier to obtain pre-certification or prior authorization.    You are strongly encouraged to contact your insurance carrier to verify that your procedure/test has been approved and is a COVERED benefit.  Although the [facility] Centralized Referral Team does its due diligence, the insurance carrier gives the disclaimer that \"Although the procedure is authorized, this does not guarantee payment.\"    Ultimately the patient is responsible for payment.   Thank you for your understanding in this matter.  Paperwork Completion:  If you require FMLA or disability paperwork for your recovery, please make sure to either drop it off or have it faxed to our office at [phone]. Be sure the form has your name and date of birth on it.  The form will be faxed to our Forms Department and they will complete it for you.  There is a 25$ fee for all forms that need to be filled out.  Please be aware there is a 10-14 day turnaround time.  You will need to sign a release of information (MARGARET) form if your paperwork does not come with one.  You may call the Forms Department with any questions at [phone].  Their fax number is [phone].

## 2024-02-12 NOTE — H&P
Orthopaedic Surgery  [address] [place] [id number]  [phone]     NEW PATIENT VISIT - HISTORY AND PHYSICAL EXAMINATION     Name: Javad Bang   MRN: [id number]  Date: 2/12/2024     CC: Left shoulder pain and stiffness    REFERRED BY: Tanmay Nowak MD    HPI:   Javad Bang is a very pleasant 58 year old right-hand dominant male who presents today for evaluation of left shoulder pain ongoing for 8 months, related to playing hockey. Pain is up to 5/10. Pain worsens with leaning on the shoulder and improves with rest and anti-inflammatory medication. He has limitations with reaching behind the back. Denies any prior injuries to the shoulder.    He enjoys hockey. Lives at home with wife. Works in IT.    PMH:   Past Medical History:   Diagnosis Date    Back problem     DIABETES     Diabetes (HCC)     Diverticulitis 2/16    Diverticulosis of large intestine     Esophageal reflux     High blood pressure     High cholesterol     HYPERLIPIDEMIA     KIDNEY STONE     Muscle weakness     left leg weakness    SLEEP APNEA     dx 6 months ago but patient doubts his dx    Swine flu     Visual impairment     glasses       PAST SURGICAL HX:  Past Surgical History:   Procedure Laterality Date    ANGIOPLASTY (CORONARY)  10/17/16    stent circumflex    APPENDECTOMY      COLONOSCOPY  4/7/16= Diverticulosis    Repeat 2026    COLONOSCOPY,DIAGNOSTIC N/A 4/7/2016    Procedure: COLONOSCOPY, POSSIBLE BIOPSY, POSSIBLE POLYPECTOMY 03533;  Surgeon: Noel Hamilton MD;  Location: [facility], [facility]    CYSTOSCOPY,INSERT URETERAL STENT      remove kidney stone    OTHER SURGICAL HISTORY  2008    kidney stone removed    TONSILLECTOMY         FAMILY HX:  History reviewed. No pertinent family history.    ALLERGIES:  Patient has no known allergies.    MEDICATIONS:   Current Outpatient Medications   Medication Sig Dispense Refill    nortriptyline 10 MG Oral Cap Take 1 capsule (10 mg total) by mouth nightly. 30 capsule 5     Omeprazole 40 MG Oral Capsule Delayed Release Take 1 capsule (40 mg total) by mouth daily. 90 capsule 3    zolpidem (AMBIEN) 5 MG Oral Tab Take 1 tablet (5 mg total) by mouth nightly as needed for Sleep. 30 tablet 0    atorvastatin 40 MG Oral Tab Take 1 tablet (40 mg total) by mouth nightly. 90 tablet 0    metFORMIN HCl 1000 MG Oral Tab Take 1 tablet (1,000 mg total) by mouth 2 (two) times daily with meals.  0    clopidogrel 75 MG Oral Tab Take 1 tablet (75 mg total) by mouth daily. 90 tablet 3    METOPROLOL TARTRATE 25 MG Oral Tab TAKE 0.5 TABLETS (12.5 MG TOTAL) BY MOUTH 2 (TWO) TIMES DAILY. 90 tablet 2    FLUTICASONE PROPIONATE 50 MCG/ACT Nasal Suspension SPRAY 2 SPRAYS INTO EACH NOSTRIL EVERY DAY 48 mL 0    Empagliflozin (JARDIANCE) 25 MG Oral Tab Take 25 mg by mouth daily.      Multiple Vitamins-Minerals (CENTRUM ADULTS OR) Take by mouth daily.      aspirin 81 MG Oral Tab Take 1 tablet (81 mg total) by mouth daily.      Benazepril HCl 20 MG Oral Tab Take 1 tablet (20 mg total) by mouth 2 (two) times daily. Once in morning, once in evening.      divalproex ER (DEPAKOTE ER) 500 MG Oral Tablet 24 Hr Take 1 tablet (500 mg total) by mouth daily. (Patient not taking: Reported on 2024)         ROS: A comprehensive 14 point review of systems was performed and was negative aside from the aforementioned per history of present illness.    SOCIAL HX:  Social History     Tobacco Use    Smoking status: Former     Types: Cigarettes     Quit date: 1993     Years since quittin.8     Passive exposure: Never    Smokeless tobacco: Never   Substance Use Topics    Alcohol use: Yes     Alcohol/week: 2.0 standard drinks of alcohol     Types: 2 Glasses of wine per week     Comment: socially       PE:   Vitals:    24 1103   Weight: 180 lb   Height: 5' 7\" (1.702 m)     Estimated body mass index is 28.19 kg/m² as calculated from the following:    Height as of this encounter: 5' 7\" (1.702 m).    Weight as of this  encounter: 180 lb.    Physical Exam  Constitutional:       Appearance: Normal appearance.   HENT:      Head: Normocephalic and atraumatic.   Eyes:      Extraocular Movements: Extraocular movements intact.   Neck:      Musculoskeletal: Normal range of motion and neck supple.   Cardiovascular:      Pulses: Normal pulses.   Pulmonary:      Effort: Pulmonary effort is normal. No respiratory distress.   Abdominal:      General: There is no distension.   Skin:     General: Skin is warm.      Capillary Refill: Capillary refill takes less than 2 seconds.      Findings: No bruising.   Neurological:      General: No focal deficit present.      Mental Status: Alert.   Psychiatric:         Mood and Affect: Mood normal.     Examination of the left shoulder demonstrates:   Skin is intact, warm and dry.   Cervical:  Full ROM  Spurling's  Negative    Deformity:   none  Atrophy:   none    Scapular winging: Negative    Palpation:     AC Joint   Negative  Biceps Tendon  Negative  Greater Tuberosity Negative    ROM:   Forward Flexion:  150°  Abduction:   full and symmetric  External Rotation:  60°  Internal Rotation:  Back pocket    Rotator Cuff Strength:   Supraspinatus:   5/5  Subscapularis:   5/5  Infraspinatus/Teres: 5/5    Provocative Tests:   Wiley:   Negative  Speed's:   Negative  [place]'s:   Negative  Lift-off:    Negative  Apprehension:  Negative  Sulcus Sign:   Negative    Neurovascular Upper Extremity (Bilateral)  Motor:    5/5 EPL, Finger Abduction, , Pinch, Deltoid  Sensation:   intact to light touch median, ulnar, radial and axillary nerve  Circulation:   Normal, 2+ radial pulse    The contralateral upper extremity is without limitation in range of motion or strength, no positive provocative maneuvers.       Radiographic Examination/Diagnostics:    Shoulder XR personally viewed, independently interpreted and radiology report was reviewed.    XR SHOULDER, COMPLETE (MIN 2 VIEWS), LEFT (CPT=73030)    Result Date:  2/12/2024  PROCEDURE:  XR SHOULDER, COMPLETE (MIN 2 VIEWS), LEFT (CPT=73030)  TECHNIQUE:  Multiple views were obtained.  COMPARISON:  None.  INDICATIONS:  M25.512 Left shoulder pain, unspecified chronicity  PATIENT STATED HISTORY: (As transcribed by Technologist)  Patient states left shoulder pain from a fall 8 months ago while playing hockey.    FINDINGS:  No evidence of acute displaced fracture or dislocation.  Normal mineralization.  Unremarkable soft tissues.  Mild osteoarthritic changes in the left acromioclavicular joint.            CONCLUSION:  No evidence of acute displaced fracture or dislocation in the left shoulder.  LOCATION:  [facility]   Dictated by (CST): Santiago Saleh MD on 2/12/2024 at 10:56 AM     Finalized by (CST): Santiago Saleh MD on 2/12/2024 at 10:56 AM       Status and review demonstrates no evidence of fracture or dislocation.  Well-maintained glenohumeral joint articulation.    IMPRESSION: Javad Bang is a 58 year old Right hand dominant male with left adhesive capsulitis ongoing for 8 months, related to hockey. Pain improves with rest and anti-inflammatory medication.    We elected to maximize conservative management with intra-articular corticosteroid and ketorolac injection coupled with physical therapy.     PLAN:   We had a detailed discussion outlining the etiology, anatomy, pathophysiology, and natural history of adhesive capsulitis. Imaging was reviewed in detail and correlated to a 3-dimensional model of the shoulder.  I outlined that in certain scenarios there is persistent shoulder stiffness and lack of symptomatic resolution which may require surgical management with manipulation under anesthesia or lysis of adhesions/arthroscopic capsular release.  This is rare, and often repeat corticosteroid injections are capable of resolving symptoms.    I provided a glenohumeral intra-articular corticosteroid and ketorolac injection using sterile technique.  The patient noted immediate  symptomatic relief and slight increase in range of motion.  They were taught home exercises to regain forward elevation, internal rotation, abduction and provided with a physical therapy prescription.  All questions were answered to their satisfaction.     FOLLOW-UP:   Return to clinic on an as needed basis.       Chriss Esposito MD  Knee, Shoulder, & Elbow Surgery / Sports Medicine Specialist  Orthopaedic Surgery  [address] [place] [address] [id number] [place] [email or web address]  [email or web address]  t: [phone]  o: [phone]  f: [phone]    This note was dictated using Dragon software.  While it was briefly proofread prior to completion, some grammatical, spelling, and word choice errors due to dictation may still occur.

## 2024-02-12 NOTE — PROCEDURES
Left Shoulder Glenohumeral Joint Injection    Name: Javad Bang   MRN: [id number]  Date: 2/12/2024     Clinical Indications:   Adhesive Capsulitis.     After informed consent, the injection site was marked, sterilized with topical chlorhexidine antiseptic, and locally anesthetized with skin refrigerant.    The patient was seated upright and the shoulder was exposed. Using sterile technique: 1 mL of 30mg/mL of Ketorolac, 2 mL of 0.5% Bupivicaine, 2 mL of 1% Lidocaine, and 1 mg of 40mg/mL of Triamcinolone (Kenalog) was injected with a Anterior approach utilizing a 22 gauge needle.  A band-aid was applied.  The patient tolerated the procedure well.        Chriss Esposito MD  Knee, Shoulder, & Elbow Surgery / Sports Medicine Specialist  Orthopaedic Surgery  [address] [place] [address].org  [email or web address]  t: [phone]  o: [phone]  f: [phone]

## 2024-02-13 ENCOUNTER — PATIENT MESSAGE (OUTPATIENT)
Dept: INTERNAL MEDICINE CLINIC | Facility: CLINIC | Age: 59
End: 2024-02-13

## 2024-02-13 DIAGNOSIS — I65.09 VERTEBRAL ARTERY STENOSIS, UNSPECIFIED LATERALITY: Primary | ICD-10-CM

## 2024-02-14 ENCOUNTER — TELEPHONE (OUTPATIENT)
Dept: SURGERY | Facility: CLINIC | Age: 59
End: 2024-02-14

## 2024-02-14 NOTE — TELEPHONE ENCOUNTER
ELIO; Advised pt apt w/Dr Sparrow today @ 1:40 has been canceled due to Dr not treating dx. Please assist pt with rescheduling with neurology when he calls back.

## 2024-02-15 NOTE — TELEPHONE ENCOUNTER
Per AD 2/8 OV note:     Chronic headaches:  Post-covid onset 18 months ago  Suspected tension-type vs medication overdue   Post evaluation by neurology service: refractory to use of depakote and Nurtec  Hold NSAID therapy. Trial of triptan therapy.   Trial of holding atorvastatin   No dietary or positional or situational triggers  Feels consistently well when awakes; unlikely hypoxia-induced/RONNY  May use heat, however, no cervical paraspinal or trapezius muscle spasm identified    Pantoprazole was changed to omeprazole at OV. Pt was referred to Dr. Sparrow for vertebral artery stenosis and chronic HA. Appt was cancelled as Dr. Sparrow does not treat this dx.     AD, any other recs on who to have pt see? Continue with neuro? He saw Dr. Jacoob on 2/12

## 2024-02-15 NOTE — TELEPHONE ENCOUNTER
Agree with omeprazole   Trial of holding atorvastatin  Referred to Dr. Najjar with the vascular surgery service

## 2024-02-26 DIAGNOSIS — G47.00 INSOMNIA, UNSPECIFIED TYPE: Primary | ICD-10-CM

## 2024-02-27 RX ORDER — ZOLPIDEM TARTRATE 5 MG/1
5 TABLET ORAL NIGHTLY PRN
Qty: 30 TABLET | Refills: 0 | Status: SHIPPED | OUTPATIENT
Start: 2024-02-27 | End: 2024-04-15

## 2024-03-01 DIAGNOSIS — I21.4 NSTEMI (NON-ST ELEVATED MYOCARDIAL INFARCTION) (HCC): ICD-10-CM

## 2024-03-01 DIAGNOSIS — E78.00 HYPERCHOLESTEREMIA: ICD-10-CM

## 2024-03-17 ENCOUNTER — PATIENT MESSAGE (OUTPATIENT)
Dept: NEUROLOGY | Facility: CLINIC | Age: 59
End: 2024-03-17

## 2024-03-17 DIAGNOSIS — G43.001 MIGRAINE WITHOUT AURA AND WITH STATUS MIGRAINOSUS, NOT INTRACTABLE: Primary | ICD-10-CM

## 2024-03-18 RX ORDER — NORTRIPTYLINE HYDROCHLORIDE 25 MG/1
25 CAPSULE ORAL NIGHTLY
Qty: 30 CAPSULE | Refills: 5 | Status: SHIPPED | OUTPATIENT
Start: 2024-03-18

## 2024-03-18 NOTE — TELEPHONE ENCOUNTER
From: Javad Bang  To: Ben Jacobo  Sent: 3/17/2024 2:17 PM CDT  Subject: Headaches    I have been taking nortriptyline for about 5 weeks now. The results are pretty slim.    If the headaches were at a level 10 before they are about 8.5 now    I have lost a lot of my balance. I have fallen twice during hockey and probably got a light concussion because of it. I had to take 3 week break from exercise.    I have increased blurry vision making it hard to drive.    I still have to take Motrin to minimize the pain from the headaches. They still starts every day between noon and 3pm and lasts about 2-3 hours. I am still so tired during the day that I have to take 2-3 naps.    If I don't take any sleep aids at bedtime, I wake up with a blistering headache.

## 2024-03-18 NOTE — PROGRESS NOTES
[facility]   3/18/2024    11:49 AM        Dear Mr Bang     I received your message and relayed that you can increase the Nortriptyline from 10 mg to double that 20 mg (2 cap) until you ran out of the 10 mg after which we are going to use 25 mg     Meanwhile with your history of falling and balance, we need to repeat your MRI of BRAIN.  I have sent a prescription of an MRI through [facility] in [place] which is an affiliate of [facility].       Please call us when your can to discuss the matter     Dr. LUCIANA Jacobo

## 2024-03-29 ENCOUNTER — LAB ENCOUNTER (OUTPATIENT)
Dept: LAB | Facility: HOSPITAL | Age: 59
End: 2024-03-29
Attending: INTERNAL MEDICINE
Payer: COMMERCIAL

## 2024-03-29 DIAGNOSIS — E11.9 TYPE 2 DIABETES MELLITUS WITHOUT COMPLICATION, WITHOUT LONG-TERM CURRENT USE OF INSULIN (HCC): ICD-10-CM

## 2024-03-29 LAB
ALBUMIN SERPL-MCNC: 3.9 G/DL (ref 3.4–5)
ALBUMIN/GLOB SERPL: 1 {RATIO} (ref 1–2)
ALP LIVER SERPL-CCNC: 95 U/L
ALT SERPL-CCNC: 54 U/L
ANION GAP SERPL CALC-SCNC: 7 MMOL/L (ref 0–18)
AST SERPL-CCNC: 74 U/L (ref 15–37)
BILIRUB SERPL-MCNC: 0.6 MG/DL (ref 0.1–2)
BUN BLD-MCNC: 26 MG/DL (ref 9–23)
CALCIUM BLD-MCNC: 9.8 MG/DL (ref 8.5–10.1)
CHLORIDE SERPL-SCNC: 106 MMOL/L (ref 98–112)
CHOLEST SERPL-MCNC: 123 MG/DL (ref ?–200)
CO2 SERPL-SCNC: 26 MMOL/L (ref 21–32)
CREAT BLD-MCNC: 0.94 MG/DL
CREAT UR-SCNC: 106 MG/DL
EGFRCR SERPLBLD CKD-EPI 2021: 93 ML/MIN/1.73M2 (ref 60–?)
EST. AVERAGE GLUCOSE BLD GHB EST-MCNC: 171 MG/DL (ref 68–126)
FASTING PATIENT LIPID ANSWER: YES
FASTING STATUS PATIENT QL REPORTED: YES
GLOBULIN PLAS-MCNC: 3.8 G/DL (ref 2.8–4.4)
GLUCOSE BLD-MCNC: 124 MG/DL (ref 70–99)
HBA1C MFR BLD: 7.6 % (ref ?–5.7)
HDLC SERPL-MCNC: 30 MG/DL (ref 40–59)
LDLC SERPL CALC-MCNC: 61 MG/DL (ref ?–100)
MICROALBUMIN UR-MCNC: 0.9 MG/DL
MICROALBUMIN/CREAT 24H UR-RTO: 8.5 UG/MG (ref ?–30)
NONHDLC SERPL-MCNC: 93 MG/DL (ref ?–130)
OSMOLALITY SERPL CALC.SUM OF ELEC: 294 MOSM/KG (ref 275–295)
POTASSIUM SERPL-SCNC: 4.5 MMOL/L (ref 3.5–5.1)
PROT SERPL-MCNC: 7.7 G/DL (ref 6.4–8.2)
SODIUM SERPL-SCNC: 139 MMOL/L (ref 136–145)
TRIGL SERPL-MCNC: 191 MG/DL (ref 30–149)
VLDLC SERPL CALC-MCNC: 28 MG/DL (ref 0–30)

## 2024-03-29 PROCEDURE — 82570 ASSAY OF URINE CREATININE: CPT

## 2024-03-29 PROCEDURE — 80061 LIPID PANEL: CPT

## 2024-03-29 PROCEDURE — 36415 COLL VENOUS BLD VENIPUNCTURE: CPT

## 2024-03-29 PROCEDURE — 82043 UR ALBUMIN QUANTITATIVE: CPT

## 2024-03-29 PROCEDURE — 80053 COMPREHEN METABOLIC PANEL: CPT

## 2024-03-29 PROCEDURE — 83036 HEMOGLOBIN GLYCOSYLATED A1C: CPT

## 2024-04-15 DIAGNOSIS — G47.00 INSOMNIA, UNSPECIFIED TYPE: ICD-10-CM

## 2024-04-15 RX ORDER — ZOLPIDEM TARTRATE 5 MG/1
5 TABLET ORAL NIGHTLY PRN
Qty: 30 TABLET | Refills: 0 | Status: SHIPPED | OUTPATIENT
Start: 2024-04-15

## 2024-04-19 ENCOUNTER — HOSPITAL ENCOUNTER (OUTPATIENT)
Age: 59
Discharge: ANOTHER HOSPITAL | End: 2024-04-19
Payer: COMMERCIAL

## 2024-04-19 ENCOUNTER — APPOINTMENT (OUTPATIENT)
Dept: GENERAL RADIOLOGY | Age: 59
End: 2024-04-19
Attending: NURSE PRACTITIONER
Payer: COMMERCIAL

## 2024-04-19 ENCOUNTER — APPOINTMENT (OUTPATIENT)
Dept: CT IMAGING | Facility: HOSPITAL | Age: 59
End: 2024-04-19
Attending: NURSE PRACTITIONER
Payer: COMMERCIAL

## 2024-04-19 VITALS
RESPIRATION RATE: 18 BRPM | OXYGEN SATURATION: 98 % | HEART RATE: 80 BPM | SYSTOLIC BLOOD PRESSURE: 116 MMHG | TEMPERATURE: 98 F | DIASTOLIC BLOOD PRESSURE: 76 MMHG

## 2024-04-19 DIAGNOSIS — S09.90XA INJURY OF HEAD, INITIAL ENCOUNTER: Primary | ICD-10-CM
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
## 2024-04-19 DIAGNOSIS — S49.91XA INJURY OF RIGHT SHOULDER, INITIAL ENCOUNTER: ICD-10-CM

## 2024-04-19 PROCEDURE — A4565 SLINGS: HCPCS | Performed by: NURSE PRACTITIONER

## 2024-04-19 PROCEDURE — 70450 CT HEAD/BRAIN W/O DYE: CPT | Performed by: NURSE PRACTITIONER

## 2024-04-19 PROCEDURE — 99203 OFFICE O/P NEW LOW 30 MIN: CPT | Performed by: NURSE PRACTITIONER

## 2024-04-19 PROCEDURE — 73030 X-RAY EXAM OF SHOULDER: CPT | Performed by: NURSE PRACTITIONER

## 2024-04-19 NOTE — ED PROVIDER NOTES
Patient Seen in: [facility]      History     Chief Complaint   Patient presents with    Arm or Hand Injury     Stated Complaint: Shoulder pain    Subjective:   This a 59-year-old male with below stated medical history.  Presents to immediate care for fall.  Reports he was playing hockey last night fell onto his right side.  States he did hit his head and may have lost consciousness for a few seconds.  He does take Plavix daily.  Reports pain in the right shoulder.  Worse with movement.  Denies any neck or back pain.  Denies any numbness or tingling to the extremities.  Denies any severe headaches, dizziness, or vomiting.  No treatment attempted prior to arrival    The history is provided by the patient.           Objective:   Past Medical History:    Back problem    DIABETES    Diabetes (HCC)    Diverticulitis    Diverticulosis of large intestine    Esophageal reflux    High blood pressure    High cholesterol    HYPERLIPIDEMIA    KIDNEY STONE    Muscle weakness    left leg weakness    SLEEP APNEA    dx 6 months ago but patient doubts his dx    Swine flu    Visual impairment    glasses              Past Surgical History:   Procedure Laterality Date    Angioplasty (coronary)  10/17/16    stent circumflex    Appendectomy      Colonoscopy  16= Diverticulosis    Repeat     Colonoscopy,diagnostic N/A 2016    Procedure: COLONOSCOPY, POSSIBLE BIOPSY, POSSIBLE POLYPECTOMY 54619;  Surgeon: Noel Hamilton MD;  Location: [facility], [facility]    Cystoscopy,insert ureteral stent      remove kidney stone    Other surgical history      kidney stone removed    Tonsillectomy                  Social History     Socioeconomic History    Marital status:    Tobacco Use    Smoking status: Former     Current packs/day: 0.00     Types: Cigarettes     Quit date: 1993     Years since quittin.0     Passive exposure: Never    Smokeless tobacco: Never   Vaping Use    Vaping status:  Never Used   Substance and Sexual Activity    Alcohol use: Yes     Alcohol/week: 2.0 standard drinks of alcohol     Types: 2 Glasses of wine per week     Comment: socially    Drug use: Never   Other Topics Concern    Caffeine Concern Yes     Comment: 1 cup a day    Exercise Yes     Comment: 3 x week              Review of Systems   Constitutional:  Negative for chills and fever.   HENT:  Negative for congestion and sore throat.    Respiratory:  Negative for cough, shortness of breath and wheezing.    Cardiovascular:  Negative for chest pain, palpitations and leg swelling.   Gastrointestinal:  Negative for abdominal pain, diarrhea, nausea and vomiting.   Genitourinary:  Negative for dysuria.   Musculoskeletal:  Positive for arthralgias. Negative for back pain, neck pain and neck stiffness.   Skin:  Negative for rash.   Allergic/Immunologic: Negative for environmental allergies.   Neurological:  Negative for headaches.   Hematological:  Does not bruise/bleed easily.       Positive for stated complaint: Shoulder pain  Other systems are as noted in HPI.  Constitutional and vital signs reviewed.      All other systems reviewed and negative except as noted above.    Physical Exam     ED Triage Vitals [04/19/24 1031]   /76   Pulse 80   Resp 18   Temp 97.7 °F (36.5 °C)   Temp src Oral   SpO2 98 %   O2 Device None (Room air)       Current:/76   Pulse 80   Temp 97.7 °F (36.5 °C) (Oral)   Resp 18   SpO2 98%         Physical Exam  Vitals and nursing note reviewed.   Constitutional:       General: He is not in acute distress.     Appearance: Normal appearance. He is not ill-appearing, toxic-appearing or diaphoretic.   HENT:      Head: Normocephalic and atraumatic.      Right Ear: External ear normal.      Left Ear: External ear normal.      Nose: Nose normal.      Mouth/Throat:      Mouth: Mucous membranes are moist.      Pharynx: Oropharynx is clear.   Eyes:      General:         Right eye: No discharge.          Left eye: No discharge.      Extraocular Movements: Extraocular movements intact.      Conjunctiva/sclera: Conjunctivae normal.   Cardiovascular:      Rate and Rhythm: Normal rate.      Heart sounds: Normal heart sounds. No murmur heard.  Pulmonary:      Effort: Pulmonary effort is normal. No respiratory distress.      Breath sounds: Normal breath sounds. No stridor. No wheezing, rhonchi or rales.   Musculoskeletal:      Right shoulder: Tenderness and bony tenderness present. No swelling, deformity, effusion, laceration or crepitus. Decreased range of motion. Normal strength. Normal pulse.      Right upper arm: Normal.      Right elbow: Normal.      Cervical back: Neck supple.      Right lower leg: No edema.      Left lower leg: No edema.   Skin:     General: Skin is warm and dry.      Capillary Refill: Capillary refill takes less than 2 seconds.      Findings: No rash.   Neurological:      General: No focal deficit present.      Mental Status: He is alert and oriented to person, place, and time.   Psychiatric:         Mood and Affect: Mood normal.         Behavior: Behavior normal.               ED Course   Labs Reviewed - No data to display          X-ray of the right shoulder, CT of the brain         MDM      Vital signs stable.  Patient is well-appearing and nontoxic looking.  Presents to immediate care for right shoulder and head injury.    Differential diagnosis includes was not limited to fracture, sprain, rotator cuff injury, contusion, AC separation, dislocation, concussion, intracranial bleeding    Patient's neurological exam is unremarkable with no focal deficits.  He does take Plavix therefore requires CT of the brain due to head injury with possible loss of consciousness.  Tenderness across the right shoulder joint.  No evidence of dislocation.  Distal CMS intact.    X-ray of the right shoulder interpreted and reviewed by myself.  Results show mild arthropathy with no acute fracture or  dislocation.    Patient was given a sling for comfort.  Risks of frozen shoulder discussed.    CT brain films interpreted and viewed by myself.  Results show no acute intracranial hemorrhage or fracture.    DC home.  Resting and icing.  Tylenol for pain.  Orthopedic follow-up if needed.  Reasons to seek emergent reevaluation reviewed.  He verbalized understanding, and agreed to plan of care.  All questions answered.                               Medical Decision Making      Disposition and Plan     Clinical Impression:  1. Injury of head, initial encounter    2. Fall, initial encounter    3. Injury of right shoulder, initial encounter         Disposition:  Discharge  4/19/2024 11:03 am    Follow-up:  Tanmay Nowak MD  [address] [place] [id number]  [phone]    In 1 week      Orthopedics    In 1 week            Medications Prescribed:  Discharge Medication List as of 4/19/2024 11:04 AM

## 2024-04-19 NOTE — DISCHARGE INSTRUCTIONS
Tylenol for pain.  Rest and ice areas of discomfort.  Sparingly wear the shoulder sling as risks of frozen shoulder were discussed.  Follow closely with your orthopedist.  If you develop severe headache, vomiting, or any other worsening condition or concern.  Please be reevaluated in the emergency department.

## 2024-04-24 ENCOUNTER — OFFICE VISIT (OUTPATIENT)
Dept: NEUROLOGY | Facility: CLINIC | Age: 59
End: 2024-04-24
Payer: COMMERCIAL

## 2024-04-24 VITALS
DIASTOLIC BLOOD PRESSURE: 68 MMHG | BODY MASS INDEX: 29.51 KG/M2 | HEART RATE: 78 BPM | SYSTOLIC BLOOD PRESSURE: 120 MMHG | WEIGHT: 188 LBS | RESPIRATION RATE: 16 BRPM | HEIGHT: 67 IN

## 2024-04-24 DIAGNOSIS — I63.131 CEREBROVASCULAR ACCIDENT (CVA) DUE TO EMBOLISM OF RIGHT CAROTID ARTERY (HCC): ICD-10-CM

## 2024-04-24 DIAGNOSIS — I67.9 SMALL VESSEL DISEASE, CEREBROVASCULAR: ICD-10-CM

## 2024-04-24 DIAGNOSIS — G43.001 MIGRAINE WITHOUT AURA AND WITH STATUS MIGRAINOSUS, NOT INTRACTABLE: Primary | ICD-10-CM

## 2024-04-24 DIAGNOSIS — U09.9 LONG COVID: ICD-10-CM

## 2024-04-24 PROCEDURE — 3074F SYST BP LT 130 MM HG: CPT | Performed by: PHYSICIAN ASSISTANT

## 2024-04-24 PROCEDURE — 3008F BODY MASS INDEX DOCD: CPT | Performed by: PHYSICIAN ASSISTANT

## 2024-04-24 PROCEDURE — 99214 OFFICE O/P EST MOD 30 MIN: CPT | Performed by: PHYSICIAN ASSISTANT

## 2024-04-24 PROCEDURE — 3078F DIAST BP <80 MM HG: CPT | Performed by: PHYSICIAN ASSISTANT

## 2024-04-24 NOTE — PROGRESS NOTES
NEUROLOGY  [facility]       Previous visit and existing record notes reviewed in preparation for the face to face visit.  Relevant labs and studies reviewed and will be noted in relevant areas of this record.    Javad Bang  4/8/1965  Primary Care Provider:  Tanmay Nowak MD    4/24/2024  59 year old male,      was last seen on: Patient was last seen 2/12/24 for daily headache and at that time was given nortriptyline for headache prevention.    Seen for/plans last visit:  Headaches  Small Vessel Disease  Long Covid    Accompanied visit: No    Present condition:  After last visit he tried the nortriptyline. He noticed issues with his balance and nausea while taking it. He ended up stopping it on his own and the nausea and balance issues resolved  He continues to have the daily headaches. They typically will start between noon and 3pm. He does not wake up with them.  He finds that if he gets a good nights sleep that he will not have a headache  He is using ambien for sleep. Using the ambien 2-3 times a week. He does not snore. He had a sleep study years ago in [facility]. He uses plastic device for the nose  The only thing that helps the headace is motrin. The motrin will abort the headache but is using it daily  The headaches starts in the neck and travels to top of the head. Describes a pressure pain. No photophobia. No phonophobia. Will notice trouble focusing with the headache. No nausea. No vomiting  September 2022 decreased caffeine to 1 cup a day. Was using 5-6 cups of caffeine prior to decreasing his caffeine use   Has to urinate frequently due to jardiance. Typically able to fall back asleep  Melatonin 1.5mg sometimes helps with sleep  He tried going without alcohol for a couple of months and did not seen an improvement in the headaches  No neck pain  Nurtec seemed to make the headache worse  Depakote did not make a difference in the headaches and increase the dose to 500mg daily.   He is  unsure if he grinds his teeth  No GI complaints  Does not bother him to lay on the back of his head    CT Brain(4/19/24)        Impression   CONCLUSION:  No acute intracranial hemorrhage or depressed calvarial fracture.     Mild diffuse cerebral and cerebellar atrophy with chronic microvascular ischemic changes of aging.     MRI Brain(4/4/24)    IMPRESSION:   1. Mild to moderate white matter signal alteration is somewhat nonspecific but is compatible with chronic microvascular ischemic changes. The findings appear relatively stable or perhaps minimally progressed. No acute intracranial pathology is seen.   2. Mild to moderate paranasal sinus disease is new from the prior study.     MRA Brain(3/11/23)    Impression   CONCLUSION:       1. Mild/moderate stenosis of the supraclinoid left ICA.  2. Mild stenosis of the supraclinoid right ICA.  3. No aneurysm or vascular malformation.        MRA Neck(3/11/23)        Impression   CONCLUSION:       1. Diminished signal at the origin of the right vertebral artery may reflect motion artifact versus moderate vessel stenosis.  2. There is otherwise no hemodynamically significant carotid or vertebral artery stenosis.        Review of Systems:  Review of Systems:  Denies systemic symptoms     No CP or SOB.  No GI or  symptoms. Relevant Neuro as noted above.      Medications:      Current Outpatient Medications:     zolpidem (AMBIEN) 5 MG Oral Tab, Take 1 tablet (5 mg total) by mouth nightly as needed for Sleep., Disp: 30 tablet, Rfl: 0    glipiZIDE ER 2.5 MG Oral Tablet 24 Hr, Take 1 tablet (2.5 mg total) by mouth daily with breakfast., Disp: 90 tablet, Rfl: 0    METFORMIN HCL 1000 MG Oral Tab, TAKE 1 TABLET BY MOUTH TWICE A DAY WITH MEALS **NEEDS APPT*, Disp: 180 tablet, Rfl: 0    Omeprazole 40 MG Oral Capsule Delayed Release, Take 1 capsule (40 mg total) by mouth daily., Disp: 90 capsule, Rfl: 3    atorvastatin 40 MG Oral Tab, Take 1 tablet (40 mg total) by mouth nightly.,  Disp: 90 tablet, Rfl: 0    clopidogrel 75 MG Oral Tab, Take 1 tablet (75 mg total) by mouth daily., Disp: 90 tablet, Rfl: 3    METOPROLOL TARTRATE 25 MG Oral Tab, TAKE 0.5 TABLETS (12.5 MG TOTAL) BY MOUTH 2 (TWO) TIMES DAILY., Disp: 90 tablet, Rfl: 2    Empagliflozin (JARDIANCE) 25 MG Oral Tab, Take 25 mg by mouth daily., Disp: , Rfl:     Multiple Vitamins-Minerals (CENTRUM ADULTS OR), Take by mouth daily., Disp: , Rfl:     aspirin 81 MG Oral Tab, Take 1 tablet (81 mg total) by mouth daily., Disp: , Rfl:     Benazepril HCl 20 MG Oral Tab, Take 1 tablet (20 mg total) by mouth 2 (two) times daily. Once in morning, once in evening., Disp: , Rfl:   PRN:     Allergies:  No Known Allergies       EXAM:  /68 (BP Location: Left arm, Patient Position: Sitting, Cuff Size: adult)   Pulse 78   Resp 16   Ht 67\"   Wt 188 lb (85.3 kg)   BMI 29.44 kg/m²   Looks stated age  General Exam:  HENT:  pink conjunctiva anicteric sclerae  Neck no adenopathy, thyroid normal  Heart and Lungs:  normal  Extremities: no cyanosis, skin changes    NEURO  NAD, A&Ox3  EOMI  CN II-XII intact  Strength 5/5 all extremities  DTRs symmetric  FTN intact bilaterally  No drift on exam  Normal gait  Tandem gait intact  Romberg negative  Full rom of the neck  No pain on rom of the neck  No pain on palpation of the cervical paraspinal muscles      Problem/s Identified this visit:   1. Migraine without aura and with status migrainosus, not intractable    2. Small vessel disease, cerebrovascular    3. Long COVID          Discussion plus Diagnostics & Treatment Orders:    Patient is a 59 year old male with complaints of daily headaches. He has tried depakote, nortriptyline, and nurtec for the headache without improvement. Discussed possible triggers at length with patient. Recommended trying a mouth guard and discussed consideration of trying botox for headache prevention. He expressed full understanding.    (X) Discussed potential side effects of any  treatment relevant to above.  Includes explanation of tests as necessary.    Return in about 3 months (around 7/24/2024).      Patient understands that if needed, based on condition and or test results, follow up will be readjusted    Stormy Rodriguez PA-C  [facility]  4/24/2024, Time completed 2:59 PM

## 2024-04-24 NOTE — PATIENT INSTRUCTIONS
Refill policies:    Allow 2-3 business days for refills; controlled substances may take longer.  Contact your pharmacy at least 5 days prior to running out of medication and have them send an electronic request or submit request through the “request refill” option in your [employer] account.  Refills are not addressed on weekends; covering physicians do not authorize routine medications on weekends.  No narcotics or controlled substances are refilled after noon on Fridays or by on call physicians.  By law, narcotics must be electronically prescribed.  A 30 day supply with no refills is the maximum allowed.  If your prescription is due for a refill, you may be due for a follow up appointment.  To best provide you care, patients receiving routine medications need to be seen at least once a year.  Patients receiving narcotic/controlled substance medications need to be seen at least once every 3 months.  In the event that your preferred pharmacy does not have the requested medication in stock (e.g. Backordered), it is your responsibility to find another pharmacy that has the requested medication available.  We will gladly send a new prescription to that pharmacy at your request.    Scheduling Tests:    If your physician has ordered radiology tests such as MRI or CT scans, please contact Central Scheduling at [phone] right away to schedule the test.  Once scheduled, the [facility] Centralized Referral Team will work with your insurance carrier to obtain pre-certification or prior authorization.  Depending on your insurance carrier, approval may take 3-10 days.  It is highly recommended patients assure they have received an authorization before having a test performed.  If test is done without insurance authorization, patient may be responsible for the entire amount billed.      Precertification and Prior Authorizations:  If your physician has recommended that you have a procedure or additional testing performed the [facility]  Centralized Referral Team will contact your insurance carrier to obtain pre-certification or prior authorization.    You are strongly encouraged to contact your insurance carrier to verify that your procedure/test has been approved and is a COVERED benefit.  Although the [facility] Centralized Referral Team does its due diligence, the insurance carrier gives the disclaimer that \"Although the procedure is authorized, this does not guarantee payment.\"    Ultimately the patient is responsible for payment.   Thank you for your understanding in this matter.  Paperwork Completion:  If you require FMLA or disability paperwork for your recovery, please make sure to either drop it off or have it faxed to our office at [phone]. Be sure the form has your name and date of birth on it.  The form will be faxed to our Forms Department and they will complete it for you.  There is a 25$ fee for all forms that need to be filled out.  Please be aware there is a 10-14 day turnaround time.  You will need to sign a release of information (MARGARET) form if your paperwork does not come with one.  You may call the Forms Department with any questions at [phone].  Their fax number is [phone].

## 2024-04-29 RX ORDER — PANTOPRAZOLE SODIUM 40 MG/1
40 TABLET, DELAYED RELEASE ORAL DAILY
Qty: 90 TABLET | Refills: 0 | OUTPATIENT
Start: 2024-04-29

## 2024-05-19 ENCOUNTER — PATIENT MESSAGE (OUTPATIENT)
Dept: INTERNAL MEDICINE CLINIC | Facility: CLINIC | Age: 59
End: 2024-05-19

## 2024-05-21 RX ORDER — BENAZEPRIL HYDROCHLORIDE 20 MG/1
20 TABLET ORAL 2 TIMES DAILY
Qty: 180 TABLET | Refills: 3 | Status: SHIPPED | OUTPATIENT
Start: 2024-05-21 | End: 2025-05-16

## 2024-05-21 NOTE — TELEPHONE ENCOUNTER
From: Javad Bang  To: Tanmay Nowak  Sent: 5/19/2024 2:49 PM CDT  Subject: Refill Benazipril    Seems I can't refill Benazipril online. Almost out and going to [place] May 31

## 2024-06-01 ENCOUNTER — TELEPHONE (OUTPATIENT)
Dept: INTERNAL MEDICINE CLINIC | Facility: CLINIC | Age: 59
End: 2024-06-01

## 2024-06-01 DIAGNOSIS — E78.00 HYPERCHOLESTEREMIA: ICD-10-CM

## 2024-06-01 DIAGNOSIS — I21.4 NSTEMI (NON-ST ELEVATED MYOCARDIAL INFARCTION) (HCC): ICD-10-CM

## 2024-06-05 NOTE — TELEPHONE ENCOUNTER
Please review. Protocol Failed; No Protocol  Future Appointments   Date Time Provider Department Center   8/13/2024 10:00 AM Ben Jacobo MD [facility] message sent to patient to schedule an office visit with Provider and/or  Routed to Patient  for assistance with appointment.     Requested Prescriptions   Pending Prescriptions Disp Refills    METFORMIN HCL 1000 MG Oral Tab [Pharmacy Med Name: METFORMIN HCL 1,000 MG TABLET] 180 tablet 0     Sig: TAKE 1 TABLET BY MOUTH TWICE A DAY WITH MEALS **NEEDS APPT*       Diabetes Medication Protocol Failed - 6/1/2024  7:17 AM        Failed - Last A1C < 7.5 and within past 6 months     Lab Results   Component Value Date    A1C 7.6 (H) 03/29/2024             Passed - In person appointment or virtual visit in the past 6 mos or appointment in next 3 mos     Recent Outpatient Visits              1 month ago Migraine without aura and with status migrainosus, not intractable    [facility] Stormy Rodriguez PA    Office Visit    3 months ago Adhesive capsulitis of left shoulder    [address] Chriss Esposito MD    Office Visit    3 months ago Migraine without aura and with status migrainosus, not intractable    [facility] Ben Jacobo MD    Office Visit    3 months ago Chronic left shoulder pain    [address] Tanmay Gustafson MD    Office Visit    7 months ago Routine general medical examination at a health care facility    [address] Tanmay Gustafson MD    Office Visit          Future Appointments         Provider Department Appt Notes    In 2 months Ben Jacobo MD [facility] f/u 6 months,headaches                    Passed - Microalbumin procedure in  past 12 months or taking ACE/ARB        Passed - EGFRCR or GFRNAA > 50     GFR Evaluation  EGFRCR: 93 , resulted on 3/29/2024          Passed - GFR in the past 12 months               Future Appointments         Provider Department Appt Notes    In 2 months Ben Jacobo MD [facility] f/u 6 months,headaches          Recent Outpatient Visits              1 month ago Migraine without aura and with status migrainosus, not intractable    [facility] Stormy Rodriguez PA    Office Visit    3 months ago Adhesive capsulitis of left shoulder    [address] Chriss Esposito MD    Office Visit    3 months ago Migraine without aura and with status migrainosus, not intractable    [facility] Ben Jacobo MD    Office Visit    3 months ago Chronic left shoulder pain    [address] Tanmay Gustafson MD    Office Visit    7 months ago Routine general medical examination at a health care facility    [address] MD    Office Visit

## 2024-06-24 DIAGNOSIS — G47.00 INSOMNIA, UNSPECIFIED TYPE: ICD-10-CM

## 2024-06-26 RX ORDER — ZOLPIDEM TARTRATE 5 MG/1
5 TABLET ORAL NIGHTLY PRN
Qty: 30 TABLET | Refills: 0 | Status: SHIPPED | OUTPATIENT
Start: 2024-06-26

## 2024-06-26 NOTE — TELEPHONE ENCOUNTER
Please review; protocol failed/ has no protocol        Recent fills: 04/15/2024,02/27/2024  Last Rx written: 04/15/2024  Last Office Visit : 02/08/2024    Recent Visits  Date Type Provider Dept   02/08/24 Office Visit Tanmay Nowak MD [facility] [address]     Date Type Provider Dept   07/29/24 Appointment Tanmay Nowak MD [facility] [address]         Requested Prescriptions   Pending Prescriptions Disp Refills    zolpidem (AMBIEN) 5 MG Oral Tab 30 tablet 0     Sig: Take 1 tablet (5 mg total) by mouth nightly as needed for Sleep.       Controlled Substance Medication Failed - 6/24/2024 12:48 PM        Failed - This medication is a controlled substance - forward to provider to refill           Recent Outpatient Visits              2 months ago Migraine without aura and with status migrainosus, not intractable    [facility] Stormy Rodriguez PA    Office Visit    4 months ago Adhesive capsulitis of left shoulder    [address] Chriss Esposito MD    Office Visit    4 months ago Migraine without aura and with status migrainosus, not intractable    [facility] Ben Jacobo MD    Office Visit    4 months ago Chronic left shoulder pain    [address] Tanmay Nowak MD    Office Visit    7 months ago Routine general medical examination at a health care facility    [address] Tanmay Nowak MD    Office Visit          Future Appointments         Provider Department Appt Notes    In 1 month Tanmay Nowak MD [address] Follow up    In 1 month Ben Jacobo MD [facility] f/u 6 months,headaches

## 2024-06-27 DIAGNOSIS — E11.9 TYPE 2 DIABETES MELLITUS WITHOUT COMPLICATION, WITHOUT LONG-TERM CURRENT USE OF INSULIN (HCC): ICD-10-CM

## 2024-06-28 ENCOUNTER — HOSPITAL ENCOUNTER (OUTPATIENT)
Age: 59
Discharge: HOME | End: 2024-06-28

## 2024-06-28 VITALS
HEART RATE: 80 BPM | OXYGEN SATURATION: 97 % | TEMPERATURE: 97 F | DIASTOLIC BLOOD PRESSURE: 79 MMHG | BODY MASS INDEX: 28.25 KG/M2 | WEIGHT: 180 LBS | SYSTOLIC BLOOD PRESSURE: 112 MMHG | RESPIRATION RATE: 20 BRPM | HEIGHT: 67 IN

## 2024-06-28 DIAGNOSIS — H65.02 ACUTE SEROUS OTITIS MEDIA OF LEFT EAR, RECURRENCE NOT SPECIFIED: Primary | ICD-10-CM

## 2024-06-28 LAB — SARS-COV-2 RNA RESP QL NAA+PROBE: NOT DETECTED

## 2024-06-28 PROCEDURE — U0002 COVID-19 LAB TEST NON-CDC: HCPCS | Performed by: NURSE PRACTITIONER

## 2024-06-28 PROCEDURE — 99213 OFFICE O/P EST LOW 20 MIN: CPT | Performed by: NURSE PRACTITIONER

## 2024-06-28 RX ORDER — AMOXICILLIN AND CLAVULANATE POTASSIUM 875; 125 MG/1; MG/1
1 TABLET, FILM COATED ORAL 2 TIMES DAILY
Qty: 20 TABLET | Refills: 0 | Status: SHIPPED | OUTPATIENT
Start: 2024-06-28 | End: 2024-07-08

## 2024-06-28 NOTE — DISCHARGE INSTRUCTIONS
Please take Augmentin as prescribed.  I recommend over-the-counter antihistamines and Flonase.  Close follow-up with your PCP.

## 2024-06-28 NOTE — ED PROVIDER NOTES
Patient Seen in: Immediate Care [facility]      History     Chief Complaint   Patient presents with    Ear Problem Pain     Stated Complaint: left ear clogged x Sat    Subjective:   This is a 59-year-old male with below stated medical history.  Presents to immediate care for feeling like his left ear is clogged with pain.  Reports URI symptoms started Saturday when traveling home from [place].  No recent swimming or drainage from the ear canal.  No difficulty breathing or wheezing.  No fevers.  He has tried Sudafed and Flonase with little relief.    The history is provided by the patient.           Objective:   Past Medical History:    Back problem    DIABETES    Diabetes (HCC)    Diverticulitis    Diverticulosis of large intestine    Esophageal reflux    High blood pressure    High cholesterol    HYPERLIPIDEMIA    KIDNEY STONE    Muscle weakness    left leg weakness    SLEEP APNEA    dx 6 months ago but patient doubts his dx    Swine flu    Visual impairment    glasses              Past Surgical History:   Procedure Laterality Date    Angioplasty (coronary)  10/17/16    stent circumflex    Appendectomy      Colonoscopy  16= Diverticulosis    Repeat     Colonoscopy,diagnostic N/A 2016    Procedure: COLONOSCOPY, POSSIBLE BIOPSY, POSSIBLE POLYPECTOMY 64688;  Surgeon: Noel Hamilton MD;  [facility]    Cystoscopy,insert ureteral stent      remove kidney stone    Other surgical history      kidney stone removed    Tonsillectomy                  Social History     Socioeconomic History    Marital status:    Tobacco Use    Smoking status: Former     Current packs/day: 0.00     Types: Cigarettes     Quit date: 1993     Years since quittin.1     Passive exposure: Never    Smokeless tobacco: Never   Vaping Use    Vaping status: Never Used   Substance and Sexual Activity    Alcohol use: Yes     Alcohol/week: 2.0 standard drinks of alcohol     Types: 2 Glasses of  wine per week     Comment: socially    Drug use: Never   Other Topics Concern    Caffeine Concern Yes     Comment: 1 cup a day    Exercise Yes     Comment: 3 x week              Review of Systems   Constitutional:  Negative for chills and fever.   HENT:  Positive for congestion, ear pain, sinus pressure and sinus pain. Negative for ear discharge and sore throat.    Respiratory:  Negative for cough, shortness of breath and wheezing.    Cardiovascular:  Negative for chest pain, palpitations and leg swelling.   Gastrointestinal:  Negative for abdominal pain, constipation, diarrhea, nausea and vomiting.   Genitourinary:  Negative for dysuria.   Musculoskeletal:  Negative for back pain, neck pain and neck stiffness.   Skin:  Negative for rash.   Neurological:  Negative for headaches.   Hematological:  Does not bruise/bleed easily.       Positive for stated Chief Complaint: Ear Problem Pain    Other systems are as noted in HPI.  Constitutional and vital signs reviewed.      All other systems reviewed and negative except as noted above.    Physical Exam     ED Triage Vitals [06/28/24 1041]   /79   Pulse 80   Resp 20   Temp 97 °F (36.1 °C)   Temp src Temporal   SpO2 97 %   O2 Device None (Room air)       Current Vitals:   Vital Signs  BP: 112/79  Pulse: 80  Resp: 20  Temp: 97 °F (36.1 °C)  Temp src: Temporal    Oxygen Therapy  SpO2: 97 %  O2 Device: None (Room air)            Physical Exam  Vitals and nursing note reviewed.   Constitutional:       General: He is not in acute distress.     Appearance: Normal appearance. He is normal weight. He is not ill-appearing, toxic-appearing or diaphoretic.   HENT:      Head: Normocephalic and atraumatic.      Right Ear: Tympanic membrane, ear canal and external ear normal. No middle ear effusion. No mastoid tenderness.      Left Ear: Ear canal and external ear normal. A middle ear effusion is present. No mastoid tenderness. Tympanic membrane is injected, erythematous and  bulging. Tympanic membrane is not scarred, perforated or retracted.      Nose: Nose normal.      Mouth/Throat:      Mouth: Mucous membranes are moist.      Pharynx: Oropharynx is clear.   Eyes:      General:         Right eye: No discharge.         Left eye: No discharge.      Extraocular Movements: Extraocular movements intact.      Conjunctiva/sclera: Conjunctivae normal.   Cardiovascular:      Rate and Rhythm: Normal rate and regular rhythm.      Heart sounds: Normal heart sounds. No murmur heard.  Pulmonary:      Effort: Pulmonary effort is normal. No respiratory distress.      Breath sounds: Normal breath sounds. No stridor. No wheezing, rhonchi or rales.   Musculoskeletal:      Cervical back: Neck supple.      Right lower leg: No edema.      Left lower leg: No edema.   Skin:     General: Skin is warm and dry.      Capillary Refill: Capillary refill takes less than 2 seconds.      Findings: No rash.   Neurological:      Mental Status: He is alert and oriented to person, place, and time.   Psychiatric:         Mood and Affect: Mood normal.         Behavior: Behavior normal.               ED Course     Labs Reviewed   RAPID SARS-COV-2 BY PCR - Normal             Rapid covid         MDM      Vital signs stable. Patient is well-appearing and nontoxic looking. Presents to immediate care for upper respiratory symptoms and left ear pain.    Differential diagnosis include but not limited to COVID, sinusitis, otitis media, otitis externa, influenza, other viral URI, pneumonia     Lung sounds clear bilaterally. No respiratory distress or hypoxia.  No suspicion for pneumonia.  Left TM is intact, bulging, and erythemic consistent with otitis media.      Rapid COVID is negative.    DC home.  Rx given for Augmentin.  Recommended over-the-counter antihistamines and Flonase. Tylenol and/or ibuprofen for pain or fever. The importance of oral hydration and close follow-up with primary provider in 1 week discussed. Reasons to  seek emergent treatment reinforced. Patient verbalized understanding, and agreed with plan of care. All questions answered.                                     Medical Decision Making      Disposition and Plan     Clinical Impression:  1. Acute serous otitis media of left ear, recurrence not specified         Disposition:  Discharge  6/28/2024 11:11 am    Follow-up:  Tanmay Nowak MD  [address] [place] [id number]  [phone]    In 1 week            Medications Prescribed:  Current Discharge Medication List        START taking these medications    Details   amoxicillin clavulanate 875-125 MG Oral Tab Take 1 tablet by mouth 2 (two) times daily for 10 days.  Qty: 20 tablet, Refills: 0

## 2024-07-01 RX ORDER — GLIPIZIDE 2.5 MG/1
2.5 TABLET, EXTENDED RELEASE ORAL
Qty: 90 TABLET | Refills: 0 | Status: SHIPPED | OUTPATIENT
Start: 2024-07-01

## 2024-07-01 NOTE — TELEPHONE ENCOUNTER
Please review; protocol failed/No Protocol    Sent [employer] message to patient to complete labs that are due around 06/27/2024.    Requested Prescriptions   Pending Prescriptions Disp Refills    GLIPIZIDE ER 2.5 MG Oral Tablet 24 Hr [Pharmacy Med Name: GLIPIZIDE ER 2.5 MG TABLET] 90 tablet 0     Sig: TAKE 1 TABLET BY MOUTH DAILY WITH BREAKFAST.       Diabetes Medication Protocol Failed - 6/27/2024 12:20 AM        Failed - Last A1C < 7.5 and within past 6 months     Lab Results   Component Value Date    A1C 7.6 (H) 03/29/2024             Passed - In person appointment or virtual visit in the past 6 mos or appointment in next 3 mos     Recent Outpatient Visits              2 months ago Migraine without aura and with status migrainosus, not intractable    [facility] Stormy Rodriguez PA    Office Visit    4 months ago Adhesive capsulitis of left shoulder    [address] Chriss Esposito MD    Office Visit    4 months ago Migraine without aura and with status migrainosus, not intractable    [facility] Ben Jacobo MD    Office Visit    4 months ago Chronic left shoulder pain    [address] Tanmay Gustafson MD    Office Visit    8 months ago Routine general medical examination at a health care facility    [address] Tanmay Gustafson MD    Office Visit          Future Appointments         Provider Department Appt Notes    In 1 month Ben Jacobo MD [facility] f/u 6 months,headaches                    Passed - Microalbumin procedure in past 12 months or taking ACE/ARB        Passed - EGFRCR or GFRNAA > 50     GFR Evaluation  EGFRCR: 93 , resulted on 3/29/2024          Passed - GFR in the past 12 months           Future Appointments         Provider  Department Appt Notes    In 1 month Ben Jacobo MD [facility] f/u 6 months,headaches          Recent Outpatient Visits              2 months ago Migraine without aura and with status migrainosus, not intractable    [facility] Stormy Rodriguez PA    Office Visit    4 months ago Adhesive capsulitis of left shoulder    [address] Chriss Esposito MD    Office Visit    4 months ago Migraine without aura and with status migrainosus, not intractable    [facility] Ben Jacobo MD    Office Visit    4 months ago Chronic left shoulder pain    [facility], [address] [place], [place] [address]Heriberto Amish, MD    Office Visit    8 months ago Routine general medical examination at a health care facility    [facility], [address] [place], [place] [address]Heriberto Amish, MD    Office Visit

## 2024-07-04 ENCOUNTER — PATIENT MESSAGE (OUTPATIENT)
Dept: INTERNAL MEDICINE CLINIC | Facility: CLINIC | Age: 59
End: 2024-07-04

## 2024-07-08 ENCOUNTER — HOSPITAL ENCOUNTER (OUTPATIENT)
Age: 59
Discharge: HOME | End: 2024-07-08
Payer: COMMERCIAL

## 2024-07-08 VITALS
RESPIRATION RATE: 18 BRPM | HEART RATE: 80 BPM | OXYGEN SATURATION: 97 % | HEIGHT: 67 IN | BODY MASS INDEX: 28.25 KG/M2 | DIASTOLIC BLOOD PRESSURE: 75 MMHG | WEIGHT: 180 LBS | TEMPERATURE: 99 F | SYSTOLIC BLOOD PRESSURE: 107 MMHG

## 2024-07-08 DIAGNOSIS — H65.22 LEFT CHRONIC SEROUS OTITIS MEDIA: Primary | ICD-10-CM

## 2024-07-08 PROCEDURE — 99213 OFFICE O/P EST LOW 20 MIN: CPT | Performed by: PHYSICIAN ASSISTANT

## 2024-07-08 RX ORDER — DEXAMETHASONE 4 MG/1
8 TABLET ORAL ONCE
Status: COMPLETED | OUTPATIENT
Start: 2024-07-08 | End: 2024-07-08

## 2024-07-08 NOTE — ED PROVIDER NOTES
Patient Seen in: [facility]      History     Chief Complaint   Patient presents with    Ear Pain     Stated Complaint: left ear pain    Subjective:   The history is provided by the patient.       60 yo male with PMH of DM, HLD presents to the IC due to left ear pressure for the past 2 weeks.  Initially started with URI type symptoms while traveling to [place].  Patient states his nasal congestion did improve with Sudafed however continues to have left ear pain.  Patient was evaluated 10 days ago noted to have an otitis media.  Finished Augmentin and although the pain has improved still having a lot of pressure with intermittent popping.  No fevers further nasal congestion.  No drainage from the ear no muffled hearing.  No history of PE tubes before in the past.  Taking Zyrtec and Flonase as previously instructed.    Distant history of persistent Otitis effusion     Objective:   Past Medical History:    Back problem    DIABETES    Diabetes (HCC)    Diverticulitis    Diverticulosis of large intestine    Esophageal reflux    High blood pressure    High cholesterol    HYPERLIPIDEMIA    KIDNEY STONE    Muscle weakness    left leg weakness    SLEEP APNEA    dx 6 months ago but patient doubts his dx    Swine flu    Visual impairment    glasses              Past Surgical History:   Procedure Laterality Date    Angioplasty (coronary)  10/17/16    stent circumflex    Appendectomy      Colonoscopy  4/7/16= Diverticulosis    Repeat 2026    Colonoscopy,diagnostic N/A 4/7/2016    Procedure: COLONOSCOPY, POSSIBLE BIOPSY, POSSIBLE POLYPECTOMY 36966;  Surgeon: Noel Hamilton MD;  [place] [facility] [place] [place] [facility], [facility]    Cystoscopy,insert ureteral stent      remove kidney stone    Other surgical history  2008    kidney stone removed    Tonsillectomy                  Social History     Socioeconomic History    Marital status:    Tobacco Use    Smoking status: Former     Current packs/day: 0.00     Types:  Cigarettes     Quit date: 1993     Years since quittin.2     Passive exposure: Never    Smokeless tobacco: Never   Vaping Use    Vaping status: Never Used   Substance and Sexual Activity    Alcohol use: Yes     Alcohol/week: 2.0 standard drinks of alcohol     Types: 2 Glasses of wine per week     Comment: socially    Drug use: Never   Other Topics Concern    Caffeine Concern Yes     Comment: 1 cup a day    Exercise Yes     Comment: 3 x week              Review of Systems   Constitutional: Negative.    HENT:  Positive for ear pain. Negative for congestion, ear discharge, sore throat, trouble swallowing and voice change.    Respiratory: Negative.     Cardiovascular: Negative.    Gastrointestinal: Negative.        Positive for stated Chief Complaint: Ear Pain    Other systems are as noted in HPI.  Constitutional and vital signs reviewed.      All other systems reviewed and negative except as noted above.    Physical Exam     ED Triage Vitals [24 1020]   /75   Pulse 80   Resp 18   Temp 98.7 °F (37.1 °C)   Temp src Temporal   SpO2 97 %   O2 Device None (Room air)       Current Vitals:   Vital Signs  BP: 107/75  Pulse: 80  Resp: 18  Temp: 98.7 °F (37.1 °C)  Temp src: Temporal    Oxygen Therapy  SpO2: 97 %  O2 Device: None (Room air)            Physical Exam  Vitals and nursing note reviewed.   Constitutional:       Appearance: Normal appearance.   HENT:      Head: Normocephalic.      Right Ear: Tympanic membrane, ear canal and external ear normal.      Left Ear: Ear canal and external ear normal.      Ears:      Comments: Left TM with effusion.  No bulging.  No erythema.     Nose: Nose normal.      Mouth/Throat:      Mouth: Mucous membranes are moist.   Eyes:      Extraocular Movements: Extraocular movements intact.      Conjunctiva/sclera: Conjunctivae normal.      Pupils: Pupils are equal, round, and reactive to light.   Pulmonary:      Effort: Pulmonary effort is normal.   Musculoskeletal:          General: Normal range of motion.      Cervical back: Normal range of motion.   Neurological:      General: No focal deficit present.      Mental Status: He is alert and oriented to person, place, and time.   Psychiatric:         Mood and Affect: Mood normal.         Behavior: Behavior normal.               ED Course   Labs Reviewed - No data to display                   MDM   Ddx -AOE, AOM, otitis effusion with associated tube dysfunction, mastoiditis    On exam the patient is afebrile nontoxic.  Vital signs are stable.  He is in no acute distress.  Right EAC and TM unremarkable.  Left EAC unremarkable.  Left TM with effusion not bulging.  No erythema.  No signs of rupture.  No mastoid tenderness.  No cervical lymphadenopathy.  Rest the HEENT exam is unremarkable.  Exam is consistent with otitis effusion.  Still taking Flonase and Zyrtec for the past 2 weeks without relief.  Patient is a known diabetic states his sugars are well-maintained monitors his sugars at home.  Will give one-time dose of Decadron to help with further effusion and eustachian tube inflammation.  Strict return precautions were discussed and ENT felt for follow-up was provided.  All questions were answered patient for treatment plan and discharge home                               Medical Decision Making  Problems Addressed:  Left chronic serous otitis media: acute illness or injury        Disposition and Plan     Clinical Impression:  1. Left chronic serous otitis media         Disposition:  Discharge  7/8/2024 10:46 am    Follow-up:  Tanmay Nowak MD  [address] [place] [address] [address]  [facility] [address]  [phone]          Jose Robin MD  [address] [facility] [address]  [phone]                Medications Prescribed:  Discharge Medication List as of 7/8/2024 10:49 AM

## 2024-07-08 NOTE — TELEPHONE ENCOUNTER
From: Javad Bang  To: Tanmay Nowak  Sent: 7/4/2024 3:43 PM CDT  Subject: Ear    Went to immediate care for my ear and it is not getting any better with the medication I got. What should I do next?

## 2024-07-08 NOTE — DISCHARGE INSTRUCTIONS
Continue the zyrtec daily  Use the flonase as we discussed   Continue to monitor your sugars as we discussed   Ibuprofen as needed for pain  If symptoms progress follow up with ENT - referral information provided  Return to the ER if symptoms worsen

## 2024-07-09 NOTE — TELEPHONE ENCOUNTER
Future Appointments   Date Time Provider Department Center   7/12/2024 10:20 AM Tanmay Nowak MD [facility]

## 2024-07-12 ENCOUNTER — OFFICE VISIT (OUTPATIENT)
Dept: INTERNAL MEDICINE CLINIC | Facility: CLINIC | Age: 59
End: 2024-07-12
Payer: COMMERCIAL

## 2024-07-12 VITALS
WEIGHT: 183 LBS | HEART RATE: 78 BPM | OXYGEN SATURATION: 97 % | TEMPERATURE: 98 F | RESPIRATION RATE: 18 BRPM | HEIGHT: 67 IN | DIASTOLIC BLOOD PRESSURE: 64 MMHG | BODY MASS INDEX: 28.72 KG/M2 | SYSTOLIC BLOOD PRESSURE: 100 MMHG

## 2024-07-12 DIAGNOSIS — E11.9 TYPE 2 DIABETES MELLITUS WITHOUT COMPLICATION, WITHOUT LONG-TERM CURRENT USE OF INSULIN (HCC): ICD-10-CM

## 2024-07-12 DIAGNOSIS — I10 ESSENTIAL HYPERTENSION: ICD-10-CM

## 2024-07-12 DIAGNOSIS — H93.8X2 EAR PRESSURE, LEFT: Primary | ICD-10-CM

## 2024-07-12 DIAGNOSIS — E78.00 PURE HYPERCHOLESTEROLEMIA: ICD-10-CM

## 2024-07-12 PROCEDURE — 3008F BODY MASS INDEX DOCD: CPT | Performed by: INTERNAL MEDICINE

## 2024-07-12 PROCEDURE — 3074F SYST BP LT 130 MM HG: CPT | Performed by: INTERNAL MEDICINE

## 2024-07-12 PROCEDURE — 99214 OFFICE O/P EST MOD 30 MIN: CPT | Performed by: INTERNAL MEDICINE

## 2024-07-12 PROCEDURE — 3051F HG A1C>EQUAL 7.0%<8.0%: CPT | Performed by: INTERNAL MEDICINE

## 2024-07-12 PROCEDURE — 3078F DIAST BP <80 MM HG: CPT | Performed by: INTERNAL MEDICINE

## 2024-07-12 PROCEDURE — 3061F NEG MICROALBUMINURIA REV: CPT | Performed by: INTERNAL MEDICINE

## 2024-07-12 RX ORDER — PREDNISONE 20 MG/1
40 TABLET ORAL DAILY
Qty: 10 TABLET | Refills: 0 | Status: SHIPPED | OUTPATIENT
Start: 2024-07-12 | End: 2024-07-17

## 2024-07-12 NOTE — PROGRESS NOTES
Javad Bang  4/8/1965    Chief Complaint   Patient presents with    Follow - Up       SUBJECTIVE   Javad Bang is a 59 year old male who presents as a follow-up.    The patient reports a three-week duration of a persistent left ear pressure sensation, with intermittent pain. He underwent a course of Augmentin therapy, followed by oral decadron x 1 dose. The oral steroid therapy offered a short duration of improvement. No cough, congestion, fevers, or chills.     He reports overall stable dietary and lifestyle habits. He has maintained compliance with metformin, jardiance, and glipizide for blood glucose control. Laboratory evaluation is pending collection.     Review of Systems   No f/c/chest pain or sob. No cough. No abd pain/n/v/d. No ha or dizziness. No numbness, tingling, or weakness. No other complaints today.    Current Outpatient Medications   Medication Sig Dispense Refill    predniSONE 20 MG Oral Tab Take 2 tablets (40 mg total) by mouth daily for 5 days. 10 tablet 0    glipiZIDE ER 2.5 MG Oral Tablet 24 Hr Take 1 tablet (2.5 mg total) by mouth daily with breakfast. 90 tablet 0    zolpidem (AMBIEN) 5 MG Oral Tab Take 1 tablet (5 mg total) by mouth nightly as needed for Sleep. 30 tablet 0    METFORMIN HCL 1000 MG Oral Tab TAKE 1 TABLET BY MOUTH TWICE A DAY WITH MEALS **NEEDS APPT* 180 tablet 0    Benazepril HCl 20 MG Oral Tab Take 1 tablet (20 mg total) by mouth 2 (two) times daily. Once in morning, once in evening. 180 tablet 3    Omeprazole 40 MG Oral Capsule Delayed Release Take 1 capsule (40 mg total) by mouth daily. 90 capsule 3    atorvastatin 40 MG Oral Tab Take 1 tablet (40 mg total) by mouth nightly. 90 tablet 0    clopidogrel 75 MG Oral Tab Take 1 tablet (75 mg total) by mouth daily. 90 tablet 3    METOPROLOL TARTRATE 25 MG Oral Tab TAKE 0.5 TABLETS (12.5 MG TOTAL) BY MOUTH 2 (TWO) TIMES DAILY. 90 tablet 2    Empagliflozin (JARDIANCE) 25 MG Oral Tab Take 25 mg by mouth daily.      Multiple  Vitamins-Minerals (CENTRUM ADULTS OR) Take by mouth daily.      aspirin 81 MG Oral Tab Take 1 tablet (81 mg total) by mouth daily.        No Known Allergies   Past Medical History:    Back problem    DIABETES    Diabetes (HCC)    Diverticulitis    Diverticulosis of large intestine    Esophageal reflux    High blood pressure    High cholesterol    HYPERLIPIDEMIA    KIDNEY STONE    Muscle weakness    left leg weakness    SLEEP APNEA    dx 6 months ago but patient doubts his dx    Swine flu    Visual impairment    glasses      Patient Active Problem List   Diagnosis    Type 2 diabetes mellitus without complication, without long-term current use of insulin (HCC)    Coronary artery disease involving native coronary artery of native heart without angina pectoris    Pure hypercholesterolemia    Essential hypertension    HNP (herniated nucleus pulposus), lumbar    History of CVA (cerebrovascular accident)      Past Surgical History:   Procedure Laterality Date    Angioplasty (coronary)  10/17/16    stent circumflex    Appendectomy      Colonoscopy  16= Diverticulosis    Repeat     Colonoscopy,diagnostic N/A 2016    Procedure: COLONOSCOPY, POSSIBLE BIOPSY, POSSIBLE POLYPECTOMY 70200;  Surgeon: Noel Hamilton MD;  [facility] [facility]    Cystoscopy,insert ureteral stent      remove kidney stone    Other surgical history      kidney stone removed    Tonsillectomy        Social History     Socioeconomic History    Marital status:    Tobacco Use    Smoking status: Former     Current packs/day: 0.00     Types: Cigarettes     Quit date: 1993     Years since quittin.2     Passive exposure: Never    Smokeless tobacco: Never   Vaping Use    Vaping status: Never Used   Substance and Sexual Activity    Alcohol use: Yes     Alcohol/week: 2.0 standard drinks of alcohol     Types: 2 Glasses of wine per week     Comment: socially    Drug use: Never   Other Topics Concern    Caffeine Concern  Yes     Comment: 1 cup a day    Exercise Yes     Comment: 3 x week         OBJECTIVE:   /64   Pulse 78   Temp 97.5 °F (36.4 °C)   Resp 18   Ht 5' 7\" (1.702 m)   Wt 183 lb (83 kg)   SpO2 97%   BMI 28.66 kg/m²   Constitutional: Oriented to person, place, and time. No distress.   HEENT:  Normocephalic and atraumatic. Left TM clear effusion.  Cardiovascular: Normal rate, regular rhythm and intact distal pulses.  No murmur, rubs or gallops.   Pulmonary/Chest: Effort normal and breath sounds normal. No respiratory distress.  Abdominal: Soft, nontender, and nondistended.  Musculoskeletal: No edema  Skin: Skin is warm and dry. No rash.  Psychiatric: Normal mood and affect.     ASSESSMENT AND PLAN:   Javad Bang is a 59 year old male who presents as a follow-up.    Outstanding screening and preventive measures:  None    Left ear pressure and effusion:  Prednisone 40 mg daily x 5 days/doses ordered  If symptoms persist will recommend evaluation by ENT service    DM2:  Laboratory evaluation pending collection  Continue efforts to improve dietary and lifestyle habits  Continue metformin, jardiance, and glipizide   Pure hypercholesterolemia: CMP and lipid panel pending collection. Continue atorvastatin 40 mg daily.  Hypertension without microalbuminuria: continue benazepril 20 mg daily    The patient indicates understanding of these issues and agrees to the plan.  TODAY'S ORDERS     No orders of the defined types were placed in this encounter.      Meds & Refills:  Requested Prescriptions     Signed Prescriptions Disp Refills    predniSONE 20 MG Oral Tab 10 tablet 0     Sig: Take 2 tablets (40 mg total) by mouth daily for 5 days.       Imaging & Consults:  None    No follow-ups on file.  There are no Patient Instructions on file for this visit.    All questions were answered and the patient agrees with the plan.     Thank you,  Tanmay Nowak MD

## 2024-07-16 ENCOUNTER — PATIENT MESSAGE (OUTPATIENT)
Dept: INTERNAL MEDICINE CLINIC | Facility: CLINIC | Age: 59
End: 2024-07-16

## 2024-07-16 DIAGNOSIS — I21.4 NSTEMI (NON-ST ELEVATED MYOCARDIAL INFARCTION) (HCC): ICD-10-CM

## 2024-07-16 DIAGNOSIS — E78.00 HYPERCHOLESTEREMIA: ICD-10-CM

## 2024-07-18 ENCOUNTER — LAB ENCOUNTER (OUTPATIENT)
Dept: LAB | Facility: HOSPITAL | Age: 59
End: 2024-07-18
Attending: INTERNAL MEDICINE
Payer: COMMERCIAL

## 2024-07-18 DIAGNOSIS — I10 ESSENTIAL HYPERTENSION: ICD-10-CM

## 2024-07-18 DIAGNOSIS — E78.00 PURE HYPERCHOLESTEROLEMIA: ICD-10-CM

## 2024-07-18 DIAGNOSIS — E11.9 TYPE 2 DIABETES MELLITUS WITHOUT COMPLICATION, WITHOUT LONG-TERM CURRENT USE OF INSULIN (HCC): ICD-10-CM

## 2024-07-18 LAB
ALBUMIN SERPL-MCNC: 4.3 G/DL (ref 3.2–4.8)
ALBUMIN/GLOB SERPL: 1.6 {RATIO} (ref 1–2)
ALP LIVER SERPL-CCNC: 69 U/L
ALT SERPL-CCNC: 46 U/L
ANION GAP SERPL CALC-SCNC: 4 MMOL/L (ref 0–18)
AST SERPL-CCNC: 67 U/L (ref ?–34)
BILIRUB SERPL-MCNC: 0.5 MG/DL (ref 0.2–1.1)
BUN BLD-MCNC: 19 MG/DL (ref 9–23)
CALCIUM BLD-MCNC: 9.4 MG/DL (ref 8.7–10.4)
CHLORIDE SERPL-SCNC: 107 MMOL/L (ref 98–112)
CHOLEST SERPL-MCNC: 155 MG/DL (ref ?–200)
CO2 SERPL-SCNC: 29 MMOL/L (ref 21–32)
CREAT BLD-MCNC: 0.76 MG/DL
EGFRCR SERPLBLD CKD-EPI 2021: 102 ML/MIN/1.73M2 (ref 60–?)
EST. AVERAGE GLUCOSE BLD GHB EST-MCNC: 154 MG/DL (ref 68–126)
FASTING PATIENT LIPID ANSWER: YES
FASTING STATUS PATIENT QL REPORTED: YES
GLOBULIN PLAS-MCNC: 2.7 G/DL (ref 2.8–4.4)
GLUCOSE BLD-MCNC: 108 MG/DL (ref 70–99)
HBA1C MFR BLD: 7 % (ref ?–5.7)
HDLC SERPL-MCNC: 48 MG/DL (ref 40–59)
LDLC SERPL CALC-MCNC: 80 MG/DL (ref ?–100)
NONHDLC SERPL-MCNC: 107 MG/DL (ref ?–130)
OSMOLALITY SERPL CALC.SUM OF ELEC: 293 MOSM/KG (ref 275–295)
POTASSIUM SERPL-SCNC: 4.2 MMOL/L (ref 3.5–5.1)
PROT SERPL-MCNC: 7 G/DL (ref 5.7–8.2)
SODIUM SERPL-SCNC: 140 MMOL/L (ref 136–145)
TRIGL SERPL-MCNC: 155 MG/DL (ref 30–149)
VLDLC SERPL CALC-MCNC: 24 MG/DL (ref 0–30)

## 2024-07-18 PROCEDURE — 80061 LIPID PANEL: CPT

## 2024-07-18 PROCEDURE — 80053 COMPREHEN METABOLIC PANEL: CPT

## 2024-07-18 PROCEDURE — 36415 COLL VENOUS BLD VENIPUNCTURE: CPT

## 2024-07-18 PROCEDURE — 83036 HEMOGLOBIN GLYCOSYLATED A1C: CPT

## 2024-07-18 RX ORDER — EMPAGLIFLOZIN 25 MG/1
25 TABLET, FILM COATED ORAL DAILY
Qty: 90 TABLET | Refills: 0 | Status: SHIPPED | OUTPATIENT
Start: 2024-07-18

## 2024-07-18 NOTE — TELEPHONE ENCOUNTER
From: Javad Bang  To: Tanmay Nowak  Sent: 7/16/2024 9:04 AM CDT  Subject: Jardinace and Metformin    I've run out of these meds and the website only allows to refill Metformin.

## 2024-08-13 ENCOUNTER — OFFICE VISIT (OUTPATIENT)
Dept: NEUROLOGY | Facility: CLINIC | Age: 59
End: 2024-08-13
Payer: COMMERCIAL

## 2024-08-13 VITALS
SYSTOLIC BLOOD PRESSURE: 134 MMHG | DIASTOLIC BLOOD PRESSURE: 74 MMHG | HEART RATE: 70 BPM | HEIGHT: 67 IN | WEIGHT: 183 LBS | RESPIRATION RATE: 16 BRPM | BODY MASS INDEX: 28.72 KG/M2

## 2024-08-13 DIAGNOSIS — G43.001 MIGRAINE WITHOUT AURA AND WITH STATUS MIGRAINOSUS, NOT INTRACTABLE: Primary | ICD-10-CM

## 2024-08-13 PROCEDURE — 3078F DIAST BP <80 MM HG: CPT | Performed by: OTHER

## 2024-08-13 PROCEDURE — 3008F BODY MASS INDEX DOCD: CPT | Performed by: OTHER

## 2024-08-13 PROCEDURE — 99213 OFFICE O/P EST LOW 20 MIN: CPT | Performed by: OTHER

## 2024-08-13 PROCEDURE — 3075F SYST BP GE 130 - 139MM HG: CPT | Performed by: OTHER

## 2024-08-13 NOTE — PATIENT INSTRUCTIONS
Refill policies:    Allow 2-3 business days for refills; controlled substances may take longer.  Contact your pharmacy at least 5 days prior to running out of medication and have them send an electronic request or submit request through the “request refill” option in your [employer] account.  Refills are not addressed on weekends; covering physicians do not authorize routine medications on weekends.  No narcotics or controlled substances are refilled after noon on Fridays or by on call physicians.  By law, narcotics must be electronically prescribed.  A 30 day supply with no refills is the maximum allowed.  If your prescription is due for a refill, you may be due for a follow up appointment.  To best provide you care, patients receiving routine medications need to be seen at least once a year.  Patients receiving narcotic/controlled substance medications need to be seen at least once every 3 months.  In the event that your preferred pharmacy does not have the requested medication in stock (e.g. Backordered), it is your responsibility to find another pharmacy that has the requested medication available.  We will gladly send a new prescription to that pharmacy at your request.    Scheduling Tests:    If your physician has ordered radiology tests such as MRI or CT scans, please contact Central Scheduling at [phone] right away to schedule the test.  Once scheduled, the [facility] Centralized Referral Team will work with your insurance carrier to obtain pre-certification or prior authorization.  Depending on your insurance carrier, approval may take 3-10 days.  It is highly recommended patients assure they have received an authorization before having a test performed.  If test is done without insurance authorization, patient may be responsible for the entire amount billed.      Precertification and Prior Authorizations:  If your physician has recommended that you have a procedure or additional testing performed the [facility]  Centralized Referral Team will contact your insurance carrier to obtain pre-certification or prior authorization.    You are strongly encouraged to contact your insurance carrier to verify that your procedure/test has been approved and is a COVERED benefit.  Although the [facility] Centralized Referral Team does its due diligence, the insurance carrier gives the disclaimer that \"Although the procedure is authorized, this does not guarantee payment.\"    Ultimately the patient is responsible for payment.   Thank you for your understanding in this matter.  Paperwork Completion:  If you require FMLA or disability paperwork for your recovery, please make sure to either drop it off or have it faxed to our office at [phone]. Be sure the form has your name and date of birth on it.  The form will be faxed to our Forms Department and they will complete it for you.  There is a 25$ fee for all forms that need to be filled out.  Please be aware there is a 10-14 day turnaround time.  You will need to sign a release of information (MARGARET) form if your paperwork does not come with one.  You may call the Forms Department with any questions at [phone].  Their fax number is [phone].

## 2024-08-13 NOTE — PROGRESS NOTES
NEUROLOGY  [facility]       Javad Bang  4/8/1965  Primary Care Provider:  Tanmay Nowak MD    8/13/2024  59 year old yo,  was last seen on:: April 2024    Seen for/plans last visit:  Long covid and headaches    Previous visit and existing record notes reviewed in preparation for the face to face visit.  Relevant labs and studies reviewed and will be noted in relevant areas of this record.  Accompanied visit:     (x) No.      Present condition:  Oddly, after having had any URI, the headaches resolved and has not had it for since.  Even his fatigue symptoms from long COVID is no longer an active issue.  He did not have to use any of the headache pills in the past month.    Past History update/new problem(s): As noted above    Review of Systems:  Review of Systems:  Denies systemic symptoms.     No CP or SOB.  No GI or  symptoms. Relevant Neuro as noted above.      Medications:      Current Outpatient Medications:     metFORMIN HCl 1000 MG Oral Tab, Take 1 tablet (1,000 mg total) by mouth 2 (two) times daily with meals., Disp: 180 tablet, Rfl: 0    Empagliflozin (JARDIANCE) 25 MG Oral Tab, Take 25 mg by mouth daily., Disp: 90 tablet, Rfl: 0    glipiZIDE ER 2.5 MG Oral Tablet 24 Hr, Take 1 tablet (2.5 mg total) by mouth daily with breakfast., Disp: 90 tablet, Rfl: 0    zolpidem (AMBIEN) 5 MG Oral Tab, Take 1 tablet (5 mg total) by mouth nightly as needed for Sleep., Disp: 30 tablet, Rfl: 0    Benazepril HCl 20 MG Oral Tab, Take 1 tablet (20 mg total) by mouth 2 (two) times daily. Once in morning, once in evening., Disp: 180 tablet, Rfl: 3    Omeprazole 40 MG Oral Capsule Delayed Release, Take 1 capsule (40 mg total) by mouth daily., Disp: 90 capsule, Rfl: 3    atorvastatin 40 MG Oral Tab, Take 1 tablet (40 mg total) by mouth nightly., Disp: 90 tablet, Rfl: 0    clopidogrel 75 MG Oral Tab, Take 1 tablet (75 mg total) by mouth daily., Disp: 90 tablet, Rfl: 3    METOPROLOL TARTRATE 25 MG Oral Tab,  TAKE 0.5 TABLETS (12.5 MG TOTAL) BY MOUTH 2 (TWO) TIMES DAILY., Disp: 90 tablet, Rfl: 2    Multiple Vitamins-Minerals (CENTRUM ADULTS OR), Take by mouth daily., Disp: , Rfl:     aspirin 81 MG Oral Tab, Take 1 tablet (81 mg total) by mouth daily., Disp: , Rfl:   PRN:     Allergies:  No Known Allergies       EXAM:  /74 (BP Location: Left arm, Patient Position: Sitting, Cuff Size: large)   Pulse 70   Resp 16   Ht 67\"   Wt 183 lb (83 kg)   BMI 28.66 kg/m²   Looks stated age  General Exam:  HENT:  pink conjunctiva anicteric sclerae  Neck no adenopathy, thyroid normal  Heart and Lungs:  normal  Extremities: no cyanosis, skin changes    NEURO  NEURO  Able to relate events with fluent speech and intact comprehension  CN 2-12: pupils reactive, VF full face symmetric sensation and movement tongue midline  No motor focal findings  Sensory: no lateralizing findings  Reflexes are symmetric  UMN signs: none  Gait: narrow based          INTERPRETATION of RELEVANT LABS and other DATA:          Problem/s Identified this visit:   1. Migraine without aura and with status migrainosus, not intractable          Discussion plus Diagnostics & Treatment Orders:  Conservative expectant follow up at this point moving forward      (NA) Discussed potential side effects of any treatment relevant to above.  Includes explanation of tests as necessary.    No follow-ups on file.      Patient understands that if needed, based on condition and or test results, follow up will be readjusted      Ben Jacobo MD  Vascular & General Neurology  Director, Multiple Sclerosis Program  [facility]  8/13/2024, Time completed 10:53 AM    Decision making:  ( x ) labs reviewed/ordered - 1  (  ) new diagnosis: - 1  ( x) Images & studies independently reviewed -non F2F  (  ) Case/studies discussed with other caregivers - -non F2F  (  ) Telephone time with patiern or authorized Fam member--non F2F  ( x ) other records reviewed --non  F2F including consultations  (  ) Fam meetings - patient not present --non F2F  (  ) Independent Historian obtained    Non Face to Face CPT code 30998/24074 applies as documented above    PROCEDURE DONE     (   ) see notes        After visit, patient was escorted out and handed-off discharge process and instructions to the check out desk.  No additional issues relevant to visit were raised to staff at this time interval.        This document is to be interpreted as my current opinion regarding the case as of the stated date of service based on the information available to me at this time and may supersedes any prior opinion expressed either orally or in writing.  Services rendered are only within the scope of direct medical care  Sometimes, reports may have been prepared partially using a speech recognition software technology.  If a word or phrase is confusing or out of context, please do not hesitate to call for clarification.

## 2024-09-19 DIAGNOSIS — I25.10 CORONARY ARTERY DISEASE INVOLVING NATIVE CORONARY ARTERY OF NATIVE HEART WITHOUT ANGINA PECTORIS: ICD-10-CM

## 2024-09-19 DIAGNOSIS — G47.00 INSOMNIA, UNSPECIFIED TYPE: ICD-10-CM

## 2024-09-19 DIAGNOSIS — E11.9 TYPE 2 DIABETES MELLITUS WITHOUT COMPLICATION, WITHOUT LONG-TERM CURRENT USE OF INSULIN (HCC): ICD-10-CM

## 2024-09-24 RX ORDER — METOPROLOL TARTRATE 25 MG/1
12.5 TABLET, FILM COATED ORAL 2 TIMES DAILY
Qty: 90 TABLET | Refills: 3 | Status: SHIPPED | OUTPATIENT
Start: 2024-09-24

## 2024-09-24 NOTE — TELEPHONE ENCOUNTER
Please review. Protocol Pass    Medication is listed as patient reported.     Requested Prescriptions   Pending Prescriptions Disp Refills    METOPROLOL TARTRATE 25 MG Oral Tab [Pharmacy Med Name: METOPROLOL TARTRATE 25 MG TAB] 90 tablet 3     Sig: TAKE 0.5 TABLETS BY MOUTH 2 TIMES DAILY.       Hypertension Medications Protocol Passed - 9/19/2024 12:04 AM        Passed - CMP or BMP in past 12 months        Passed - Last BP reading less than 140/90     BP Readings from Last 1 Encounters:   08/13/24 134/74               Passed - In person appointment or virtual visit in the past 12 mos or appointment in next 3 mos     Recent Outpatient Visits              1 month ago Migraine without aura and with status migrainosus, not intractable    [facility] Ben Jacobo MD    Office Visit    2 months ago Ear pressure, left    [address] Tanmay Gustafson MD    Office Visit    5 months ago Migraine without aura and with status migrainosus, not intractable    [facility] Stormy Rodriguez PA    Office Visit    7 months ago Adhesive capsulitis of left shoulder    [address] Chriss Esposito MD    Office Visit    7 months ago Migraine without aura and with status migrainosus, not intractable    [facility] Ben Jacobo MD    Office Visit                      Passed - EGFRCR or GFRNAA > 50     GFR Evaluation  EGFRCR: 102 , resulted on 7/18/2024                   Recent Outpatient Visits              1 month ago Migraine without aura and with status migrainosus, not intractable    [facility] Ben Jacobo MD    Office Visit    2 months ago Ear pressure, left    [address] Tanmay Gustafson MD    Office Visit    5  months ago Migraine without aura and with status migrainosus, not intractable    [facility], [facility] Stormy Rodriguez PA    Office Visit    7 months ago Adhesive capsulitis of left shoulder    [facility], [address] [place] [address] Chriss Esposito MD    Office Visit    7 months ago Migraine without aura and with status migrainosus, not intractable    [facility], [facility] Ben Jacobo MD    Office Visit

## 2024-09-25 DIAGNOSIS — G47.00 INSOMNIA, UNSPECIFIED TYPE: ICD-10-CM

## 2024-09-25 DIAGNOSIS — E11.9 TYPE 2 DIABETES MELLITUS WITHOUT COMPLICATION, WITHOUT LONG-TERM CURRENT USE OF INSULIN (HCC): ICD-10-CM

## 2024-09-25 RX ORDER — OMEPRAZOLE 40 MG/1
40 CAPSULE, DELAYED RELEASE ORAL DAILY
Qty: 90 CAPSULE | Refills: 3 | OUTPATIENT
Start: 2024-09-25 | End: 2025-09-20

## 2024-09-25 RX ORDER — GLIPIZIDE 2.5 MG/1
2.5 TABLET, EXTENDED RELEASE ORAL
Qty: 90 TABLET | Refills: 3 | Status: SHIPPED | OUTPATIENT
Start: 2024-09-25

## 2024-09-25 RX ORDER — ZOLPIDEM TARTRATE 5 MG/1
5 TABLET ORAL NIGHTLY PRN
Qty: 30 TABLET | Refills: 0 | OUTPATIENT
Start: 2024-09-25

## 2024-09-25 RX ORDER — GLIPIZIDE 2.5 MG/1
2.5 TABLET, EXTENDED RELEASE ORAL
Qty: 90 TABLET | Refills: 0 | OUTPATIENT
Start: 2024-09-25

## 2024-09-25 RX ORDER — OMEPRAZOLE 40 MG/1
40 CAPSULE, DELAYED RELEASE ORAL DAILY
Qty: 90 CAPSULE | Refills: 3 | Status: SHIPPED | OUTPATIENT
Start: 2024-09-25 | End: 2025-09-20

## 2024-09-25 RX ORDER — ZOLPIDEM TARTRATE 5 MG/1
5 TABLET ORAL NIGHTLY PRN
Qty: 30 TABLET | Refills: 0 | Status: SHIPPED | OUTPATIENT
Start: 2024-09-25

## 2024-09-25 NOTE — TELEPHONE ENCOUNTER
Refill passed per [facility] protocol.  Requested Prescriptions   Pending Prescriptions Disp Refills    Omeprazole 40 MG Oral Capsule Delayed Release 90 capsule 3     Sig: Take 1 capsule (40 mg total) by mouth daily.       Gastrointestional Medication Protocol Passed - 9/19/2024  4:26 PM        Passed - In person appointment or virtual visit in the past 12 mos or appointment in next 3 mos     Recent Outpatient Visits              1 month ago Migraine without aura and with status migrainosus, not intractable    [facility] Ben Jacobo MD    Office Visit    2 months ago Ear pressure, left    [address], Tanmay Gustafson MD    Office Visit    5 months ago Migraine without aura and with status migrainosus, not intractable    [facility] Stormy Rodriguez PA    Office Visit    7 months ago Adhesive capsulitis of left shoulder    [address] Chriss Esposito MD    Office Visit    7 months ago Migraine without aura and with status migrainosus, not intractable    [facility] Ben Jacobo MD    Office Visit                        zolpidem (AMBIEN) 5 MG Oral Tab 30 tablet 0     Sig: Take 1 tablet (5 mg total) by mouth nightly as needed for Sleep.       Controlled Substance Medication Failed - 9/19/2024  4:26 PM        Failed - This medication is a controlled substance - forward to provider to refill          glipiZIDE ER 2.5 MG Oral Tablet 24 Hr 90 tablet 0     Sig: Take 1 tablet (2.5 mg total) by mouth daily with breakfast.       Diabetes Medication Protocol Passed - 9/19/2024  4:26 PM        Passed - Last A1C < 7.5 and within past 6 months     Lab Results   Component Value Date    A1C 7.0 (H) 07/18/2024             Passed - In person appointment or virtual visit in the past 6 mos or  appointment in next 3 mos     Recent Outpatient Visits              1 month ago Migraine without aura and with status migrainosus, not intractable    [facility] [place], [facility] Ben Jacobo MD    Office Visit    2 months ago Ear pressure, left    [facility] [place], [address] [place] [address], Tanmay Gustafson MD    Office Visit    5 months ago Migraine without aura and with status migrainosus, not intractable    [facility] [place], [facility] Stormy Rodriguez PA    Office Visit    7 months ago Adhesive capsulitis of left shoulder    [facility] [place], [address] [place] [address] Chriss Esposito MD    Office Visit    7 months ago Migraine without aura and with status migrainosus, not intractable    [facility]    Office Visit                      Passed - Microalbumin procedure in past 12 months or taking ACE/ARB        Passed - EGFRCR or GFRNAA > 50     GFR Evaluation  EGFRCR: 102 , resulted on 7/18/2024          Passed - GFR in the past 12 months           Recent Outpatient Visits              1 month ago Migraine without aura and with status migrainosus, not intractable    [facility] [place], [facility] Ben Jacobo MD    Office Visit    2 months ago Ear pressure, left    [facility] [place], [address] [place] [address] Tanmay Gustafson MD    Office Visit    5 months ago Migraine without aura and with status migrainosus, not intractable    [facility] [place], [facility] Stormy Rodriguez PA    Office Visit    7 months ago Adhesive capsulitis of left shoulder    [facility] [place], [address] [place], [place] [address]Chriss Berg MD    Office Visit    7 months ago Migraine without aura and with status migrainosus, not intractable    [facility]    [facility] Visit

## 2024-09-25 NOTE — TELEPHONE ENCOUNTER
Please review; protocol failed/ has no protocol      Please review pended refill request as unable to refill due to high/very high interaction warning copied here:      High  Drug-Drug: Omeprazole and clopidogrelUse of omeprazole may lead to reduced ability of clopidogrel to inhibit platelet aggregation and increase the risk of subsequent cardiovascular events. Coadministration of omeprazole and clopidogrel should be avoided according to the clopidogrel prescribing information. However, according to an expert consensus document, the benefits of omeprazole may outweigh the risk of the potential reduction in cardiovascular efficacy in patients receiving clopidogrel who are at high risk for gastrointestinal bleeding.  Details  Override reason        Omeprazole 40 MG Oral Capsule Delayed Release  Prescription. Reordered.  clopidogrel 75 MG Oral TabPrescription. Active.      Fill dates for Zolpidem     Recent fills: 06/26/2024,04/15/2024  Last Rx written: 06/26/2024  Last Office Visit: 07/12/2024    Recent Visits  Date Type Provider Dept   07/12/24 Office Visit Tanmay Nowak MD [facility] [address]         Requested Prescriptions   Pending Prescriptions Disp Refills    Omeprazole 40 MG Oral Capsule Delayed Release 90 capsule 3     Sig: Take 1 capsule (40 mg total) by mouth daily.       Gastrointestional Medication Protocol Passed - 9/19/2024  4:26 PM        Passed - In person appointment or virtual visit in the past 12 mos or appointment in next 3 mos     Recent Outpatient Visits              1 month ago Migraine without aura and with status migrainosus, not intractable    [facility] Ben Jacobo MD    Office Visit    2 months ago Ear pressure, left    [facility], [address] [place] [address] Tanmay Nowak MD    Office Visit    5 months ago Migraine without aura and with status migrainosus, not intractable    [facility]  [place] Stormy Rodriguez PA    Office Visit    7 months ago Adhesive capsulitis of left shoulder    [address] Chriss Esposito MD    Office Visit    7 months ago Migraine without aura and with status migrainosus, not intractable    [facility] Ben Jacobo MD    Office Visit                        zolpidem (AMBIEN) 5 MG Oral Tab 30 tablet 0     Sig: Take 1 tablet (5 mg total) by mouth nightly as needed for Sleep.       Controlled Substance Medication Failed - 9/19/2024  4:26 PM        Failed - This medication is a controlled substance - forward to provider to refill         Signed Prescriptions Disp Refills    glipiZIDE ER 2.5 MG Oral Tablet 24 Hr 90 tablet 3     Sig: Take 1 tablet (2.5 mg total) by mouth daily with breakfast.       Diabetes Medication Protocol Passed - 9/19/2024  4:26 PM        Passed - Last A1C < 7.5 and within past 6 months     Lab Results   Component Value Date    A1C 7.0 (H) 07/18/2024             Passed - In person appointment or virtual visit in the past 6 mos or appointment in next 3 mos     Recent Outpatient Visits              1 month ago Migraine without aura and with status migrainosus, not intractable    [facility] Ben Jacobo MD    Office Visit    2 months ago Ear pressure, left    [facility], [address] [place] [address], Tanmay Gustafson MD    Office Visit    5 months ago Migraine without aura and with status migrainosus, not intractable    [facility] Stormy Rodriguez PA    Office Visit    7 months ago Adhesive capsulitis of left shoulder    [address] Chriss Esposito MD    Office Visit    7 months ago Migraine without aura and with status migrainosus, not intractable    [facility]  Ben Jacobo MD    Office Visit                      Passed - Microalbumin procedure in past 12 months or taking ACE/ARB        Passed - EGFRCR or GFRNAA > 50     GFR Evaluation  EGFRCR: 102 , resulted on 7/18/2024          Passed - GFR in the past 12 months           Recent Outpatient Visits              1 month ago Migraine without aura and with status migrainosus, not intractable    [facility] Ben Jacobo MD    Office Visit    2 months ago Ear pressure, left    [address] Tanmay Gustafson MD    Office Visit    5 months ago Migraine without aura and with status migrainosus, not intractable    [facility] Stormy Rodriguez PA    Office Visit    7 months ago Adhesive capsulitis of left shoulder    [address] Chriss Esposito MD    Office Visit    7 months ago Migraine without aura and with status migrainosus, not intractable    [facility] Ben Jacobo MD    Office Visit

## 2024-10-16 NOTE — TELEPHONE ENCOUNTER
Refill passes per [facility] protocol.    No future appointments.  Last office visit: 7/12/24    Requested Prescriptions   Pending Prescriptions Disp Refills    empagliflozin (JARDIANCE) 25 MG Oral Tab 90 tablet 0     Sig: Take 1 tablet (25 mg total) by mouth daily.       Diabetes Medication Protocol Passed - 10/16/2024 10:02 AM        Passed - Last A1C < 7.5 and within past 6 months     Lab Results   Component Value Date    A1C 7.0 (H) 07/18/2024             Passed - In person appointment or virtual visit in the past 6 mos or appointment in next 3 mos     Recent Outpatient Visits              2 months ago Migraine without aura and with status migrainosus, not intractable    [facility], [facility] Ben Jacobo MD    Office Visit    3 months ago Ear pressure, left    [address] Tanmay Gustafson MD    Office Visit    5 months ago Migraine without aura and with status migrainosus, not intractable    [facility] Stormy Rodriguez PA    Office Visit    8 months ago Adhesive capsulitis of left shoulder    [address] Chriss Esposito MD    Office Visit    8 months ago Migraine without aura and with status migrainosus, not intractable    [facility] Ben Jacobo MD    Office Visit                      Passed - Microalbumin procedure in past 12 months or taking ACE/ARB        Passed - EGFRCR or GFRNAA > 50     GFR Evaluation  EGFRCR: 102 , resulted on 7/18/2024          Passed - GFR in the past 12 months               Recent Outpatient Visits              2 months ago Migraine without aura and with status migrainosus, not intractable    [facility], [facility] Ben Jacobo MD    Office Visit    3 months ago Ear pressure, left    [facility],  [address] [place] [address] Tanmay Gustafson MD    Office Visit    5 months ago Migraine without aura and with status migrainosus, not intractable    [facility] [place], [facility] Stormy Rodriguez PA    Office Visit    8 months ago Adhesive capsulitis of left shoulder    [facility] [place], [address] [place] [address] Chriss Esposito MD    Office Visit    8 months ago Migraine without aura and with status migrainosus, not intractable    [facility] [place], [facility] Ben Jacobo MD    Office Visit

## 2024-11-27 ENCOUNTER — LAB ENCOUNTER (OUTPATIENT)
Dept: LAB | Facility: HOSPITAL | Age: 59
End: 2024-11-27
Attending: INTERNAL MEDICINE
Payer: COMMERCIAL

## 2024-11-27 DIAGNOSIS — E11.9 TYPE 2 DIABETES MELLITUS WITHOUT COMPLICATION, WITHOUT LONG-TERM CURRENT USE OF INSULIN (HCC): ICD-10-CM

## 2024-11-27 LAB
ALBUMIN SERPL-MCNC: 4.5 G/DL (ref 3.2–4.8)
ALBUMIN/GLOB SERPL: 1.6 {RATIO} (ref 1–2)
ALP LIVER SERPL-CCNC: 89 U/L
ALT SERPL-CCNC: 42 U/L
ANION GAP SERPL CALC-SCNC: 9 MMOL/L (ref 0–18)
AST SERPL-CCNC: 68 U/L (ref ?–34)
BILIRUB SERPL-MCNC: 0.5 MG/DL (ref 0.2–1.1)
BUN BLD-MCNC: 14 MG/DL (ref 9–23)
CALCIUM BLD-MCNC: 9.9 MG/DL (ref 8.7–10.4)
CHLORIDE SERPL-SCNC: 106 MMOL/L (ref 98–112)
CHOLEST SERPL-MCNC: 120 MG/DL (ref ?–200)
CO2 SERPL-SCNC: 25 MMOL/L (ref 21–32)
CREAT BLD-MCNC: 0.84 MG/DL
EGFRCR SERPLBLD CKD-EPI 2021: 99 ML/MIN/1.73M2 (ref 60–?)
EST. AVERAGE GLUCOSE BLD GHB EST-MCNC: 160 MG/DL (ref 68–126)
FASTING PATIENT LIPID ANSWER: YES
FASTING STATUS PATIENT QL REPORTED: YES
GLOBULIN PLAS-MCNC: 2.8 G/DL (ref 2–3.5)
GLUCOSE BLD-MCNC: 119 MG/DL (ref 70–99)
HBA1C MFR BLD: 7.2 % (ref ?–5.7)
HDLC SERPL-MCNC: 28 MG/DL (ref 40–59)
LDLC SERPL CALC-MCNC: 64 MG/DL (ref ?–100)
NONHDLC SERPL-MCNC: 92 MG/DL (ref ?–130)
OSMOLALITY SERPL CALC.SUM OF ELEC: 292 MOSM/KG (ref 275–295)
POTASSIUM SERPL-SCNC: 4.1 MMOL/L (ref 3.5–5.1)
PROT SERPL-MCNC: 7.3 G/DL (ref 5.7–8.2)
SODIUM SERPL-SCNC: 140 MMOL/L (ref 136–145)
TRIGL SERPL-MCNC: 162 MG/DL (ref 30–149)
VLDLC SERPL CALC-MCNC: 24 MG/DL (ref 0–30)

## 2024-11-27 PROCEDURE — 36415 COLL VENOUS BLD VENIPUNCTURE: CPT

## 2024-11-27 PROCEDURE — 83036 HEMOGLOBIN GLYCOSYLATED A1C: CPT

## 2024-11-27 PROCEDURE — 80061 LIPID PANEL: CPT

## 2024-11-27 PROCEDURE — 80053 COMPREHEN METABOLIC PANEL: CPT

## 2024-12-02 DIAGNOSIS — G47.00 INSOMNIA, UNSPECIFIED TYPE: ICD-10-CM

## 2024-12-04 RX ORDER — ZOLPIDEM TARTRATE 5 MG/1
5 TABLET ORAL NIGHTLY PRN
Qty: 30 TABLET | Refills: 0 | Status: SHIPPED | OUTPATIENT
Start: 2024-12-04

## 2024-12-04 NOTE — TELEPHONE ENCOUNTER
Please review. Protocol Failed; No Protocol      Recent fills: 6/26/2024, 9/25/2024  Last Rx written: 9/25/2024  Last office visit: 7/12/2024          Requested Prescriptions   Pending Prescriptions Disp Refills    zolpidem (AMBIEN) 5 MG Oral Tab 30 tablet 0     Sig: Take 1 tablet (5 mg total) by mouth nightly as needed for Sleep.       Controlled Substance Medication Failed - 12/4/2024  2:23 PM        Failed - This medication is a controlled substance - forward to provider to refill                 Recent Outpatient Visits              3 months ago Migraine without aura and with status migrainosus, not intractable    [facility] Ben Jacobo MD    Office Visit    4 months ago Ear pressure, left    [address] Tanmay Gustafson MD    Office Visit    7 months ago Migraine without aura and with status migrainosus, not intractable    [facility] Stormy Rodriguez PA    Office Visit    9 months ago Adhesive capsulitis of left shoulder    [address] Chriss Esposito MD    Office Visit    9 months ago Migraine without aura and with status migrainosus, not intractable    [facility] Ben Jacobo MD    Office Visit

## 2024-12-11 ENCOUNTER — PATIENT MESSAGE (OUTPATIENT)
Dept: INTERNAL MEDICINE CLINIC | Facility: CLINIC | Age: 59
End: 2024-12-11

## 2024-12-17 ENCOUNTER — TELEPHONE (OUTPATIENT)
Dept: INTERNAL MEDICINE CLINIC | Facility: CLINIC | Age: 59
End: 2024-12-17

## 2024-12-17 DIAGNOSIS — E78.00 PURE HYPERCHOLESTEROLEMIA: ICD-10-CM

## 2024-12-17 DIAGNOSIS — E11.9 TYPE 2 DIABETES MELLITUS WITHOUT COMPLICATION, WITHOUT LONG-TERM CURRENT USE OF INSULIN (HCC): Primary | ICD-10-CM

## 2024-12-17 DIAGNOSIS — Z13.0 SCREENING FOR BLOOD DISEASE: ICD-10-CM

## 2024-12-17 DIAGNOSIS — E78.00 HYPERCHOLESTEREMIA: ICD-10-CM

## 2024-12-17 DIAGNOSIS — Z12.5 SPECIAL SCREENING FOR MALIGNANT NEOPLASM OF PROSTATE: ICD-10-CM

## 2024-12-17 DIAGNOSIS — Z13.29 THYROID DISORDER SCREENING: ICD-10-CM

## 2024-12-17 DIAGNOSIS — I10 ESSENTIAL HYPERTENSION: ICD-10-CM

## 2024-12-17 DIAGNOSIS — Z00.00 ROUTINE GENERAL MEDICAL EXAMINATION AT A HEALTH CARE FACILITY: ICD-10-CM

## 2024-12-17 NOTE — TELEPHONE ENCOUNTER
Patient called request labs prior to their annual physical.  Annual physical scheduled for 3/20/25 .   Please order labs. Patient preferred lab is [facility].  Patient informed request was sent to clinical team.  Patient informed to fast for labs.  No callback required.

## 2024-12-20 ENCOUNTER — OFFICE VISIT (OUTPATIENT)
Dept: INTERNAL MEDICINE CLINIC | Facility: CLINIC | Age: 59
End: 2024-12-20
Payer: COMMERCIAL

## 2024-12-20 VITALS
WEIGHT: 191.63 LBS | HEART RATE: 76 BPM | BODY MASS INDEX: 30.08 KG/M2 | SYSTOLIC BLOOD PRESSURE: 138 MMHG | DIASTOLIC BLOOD PRESSURE: 78 MMHG | HEIGHT: 67 IN

## 2024-12-20 DIAGNOSIS — L30.9 ECZEMA, UNSPECIFIED TYPE: Primary | ICD-10-CM

## 2024-12-20 PROCEDURE — 99214 OFFICE O/P EST MOD 30 MIN: CPT

## 2024-12-20 PROCEDURE — 3078F DIAST BP <80 MM HG: CPT

## 2024-12-20 PROCEDURE — 3008F BODY MASS INDEX DOCD: CPT

## 2024-12-20 PROCEDURE — 3075F SYST BP GE 130 - 139MM HG: CPT

## 2024-12-20 PROCEDURE — 3051F HG A1C>EQUAL 7.0%<8.0%: CPT

## 2024-12-20 RX ORDER — PREDNICARBATE 1 MG/G
1 OINTMENT TOPICAL NIGHTLY PRN
Qty: 15 G | Refills: 0 | Status: SHIPPED | OUTPATIENT
Start: 2024-12-20

## 2024-12-20 RX ORDER — METOPROLOL TARTRATE 25 MG/1
12.5 TABLET, FILM COATED ORAL 2 TIMES DAILY
Qty: 90 TABLET | Refills: 1 | Status: SHIPPED | OUTPATIENT
Start: 2024-12-20

## 2024-12-20 NOTE — PROGRESS NOTES
Javad Bang is a 59 year old male.   Chief Complaint   Patient presents with    Skin     Room 16B, ASZ, eczema problem and refill medicine.     HPI:    Patient here today requesting refill of prednicarbate 0.1% ointment for his eczema. Patient has not seen a dermatologist in last few years as his previous primary was refilling his eczema ointment. Patient denies any recent concerns. Does experience flares of eczema but well controlled with ointment. Patient also in need of metoprolol managed by cardiology, he will be scheduling follow up with cardiology. Pmhx: HTN, DM2, CAD, CVA, hypercholesterolemia.    Allergies:  Allergies[1]   Current Meds:  Current Outpatient Medications   Medication Sig Dispense Refill    Prednicarbate 0.1 % External Ointment Apply 1 Application topically nightly as needed. 15 g 0    metoprolol tartrate 25 MG Oral Tab Take 0.5 tablets (12.5 mg total) by mouth 2 (two) times daily. 90 tablet 1    zolpidem (AMBIEN) 5 MG Oral Tab Take 1 tablet (5 mg total) by mouth nightly as needed for Sleep. 30 tablet 0    empagliflozin (JARDIANCE) 25 MG Oral Tab Take 1 tablet (25 mg total) by mouth daily. 90 tablet 3    Omeprazole 40 MG Oral Capsule Delayed Release Take 1 capsule (40 mg total) by mouth daily. 90 capsule 3    glipiZIDE ER 2.5 MG Oral Tablet 24 Hr Take 1 tablet (2.5 mg total) by mouth daily with breakfast. 90 tablet 3    metFORMIN HCl 1000 MG Oral Tab Take 1 tablet (1,000 mg total) by mouth 2 (two) times daily with meals. 180 tablet 0    Benazepril HCl 20 MG Oral Tab Take 1 tablet (20 mg total) by mouth 2 (two) times daily. Once in morning, once in evening. 180 tablet 3    atorvastatin 40 MG Oral Tab Take 1 tablet (40 mg total) by mouth nightly. 90 tablet 0    clopidogrel 75 MG Oral Tab Take 1 tablet (75 mg total) by mouth daily. 90 tablet 3    Multiple Vitamins-Minerals (CENTRUM ADULTS OR) Take by mouth daily.      aspirin 81 MG Oral Tab Take 1 tablet (81 mg total) by mouth daily.           PMH:     Past Medical History:    Back problem    DIABETES    Diabetes (HCC)    Diverticulitis    Diverticulosis of large intestine    Esophageal reflux    High blood pressure    High cholesterol    HYPERLIPIDEMIA    KIDNEY STONE    Muscle weakness    left leg weakness    SLEEP APNEA    dx 6 months ago but patient doubts his dx    Swine flu    Visual impairment    glasses       ROS:   Review of Systems   Constitutional: Negative.    Skin:  Positive for rash.        PHYSICAL EXAM:    /78 (BP Location: Right arm, Patient Position: Sitting, Cuff Size: adult)   Pulse 76   Ht 5' 7\" (1.702 m)   Wt 191 lb 9.6 oz (86.9 kg)   BMI 30.01 kg/m²   Physical Exam  Constitutional:       Appearance: Normal appearance.   Skin:     General: Skin is warm and dry.      Findings: Rash (small patches of eczema) present.   Neurological:      Mental Status: He is alert.          ASSESSMENT/ PLAN:   1. Eczema, unspecified type  Ointment refilled. Further fills to be discussed in annual physical scheduled in march.       Health Maintenance Due   Topic Date Due    PSA  10/18/2024    Diabetes Care Foot Exam  11/03/2024    Annual Physical  11/03/2024           Pt indicates understanding and agrees to the plan.     No follow-ups on file.    SHAYLEE Garza        [1] No Known Allergies

## 2024-12-23 RX ORDER — TRIAMCINOLONE ACETONIDE 1 MG/G
CREAM TOPICAL 2 TIMES DAILY PRN
Qty: 15 G | Refills: 0 | Status: SHIPPED | OUTPATIENT
Start: 2024-12-23

## 2024-12-23 NOTE — TELEPHONE ENCOUNTER
Linda Gipson- Prednicarbate ointment that was sent 12/20 is no longer available. Can alternative be sent?

## 2025-01-08 DIAGNOSIS — E11.9 TYPE 2 DIABETES MELLITUS WITHOUT COMPLICATION, WITHOUT LONG-TERM CURRENT USE OF INSULIN (HCC): ICD-10-CM

## 2025-01-13 RX ORDER — GLIPIZIDE 2.5 MG/1
2.5 TABLET, EXTENDED RELEASE ORAL
Qty: 90 TABLET | Refills: 3 | Status: SHIPPED | OUTPATIENT
Start: 2025-01-13

## 2025-01-13 NOTE — TELEPHONE ENCOUNTER
Refill passed per [facility] protocol.  Requested Prescriptions   Pending Prescriptions Disp Refills    glipiZIDE ER 2.5 MG Oral Tablet 24 Hr 90 tablet 3     Sig: Take 1 tablet (2.5 mg total) by mouth daily with breakfast.       Diabetes Medication Protocol Passed - 1/13/2025  3:57 PM        Passed - Last A1C < 7.5 and within past 6 months     Lab Results   Component Value Date    A1C 7.2 (H) 11/27/2024             Passed - In person appointment or virtual visit in the past 6 mos or appointment in next 3 mos     Recent Outpatient Visits              3 weeks ago Eczema, unspecified type    [address] Linda Gipson APRN    Office Visit    5 months ago Migraine without aura and with status migrainosus, not intractable    [facility] Ben Jacobo MD    Office Visit    6 months ago Ear pressure, left    [address] Tanmay Nowak MD    Office Visit    8 months ago Migraine without aura and with status migrainosus, not intractable    [facility] Stormy Rodriguez PA    Office Visit    11 months ago Adhesive capsulitis of left shoulder    [address] Chriss Esposito MD    Office Visit          Future Appointments         Provider Department Appt Notes    In 2 months Tanmay Nowak MD [address] lvm/mcm for sooner time 12/17/24 jdl last cpe unk ok 12/17/24 jdl Medicine refill                    Passed - Microalbumin procedure in past 12 months or taking ACE/ARB        Passed - EGFRCR or GFRNAA > 50     GFR Evaluation  EGFRCR: 99 , resulted on 11/27/2024          Passed - GFR in the past 12 months        Passed - Medication is active on med list           Recent Outpatient Visits              3 weeks ago Eczema, unspecified type    [facility]  [facility], [address] [place] [address] Linda Gipson APRN    Office Visit    5 months ago Migraine without aura and with status migrainosus, not intractable    [facility] Ben Jacobo MD    Office Visit    6 months ago Ear pressure, left    [address] Tanmay Nowak MD    Office Visit    8 months ago Migraine without aura and with status migrainosus, not intractable    [facility] Stormy Rodriguez PA    Office Visit    11 months ago Adhesive capsulitis of left shoulder    [address] Chriss Esposito MD    Office Visit          Future Appointments         Provider Department Appt Notes    In 2 months Tanmay Nowak MD [address] lvm/mcm for sooner time 12/17/24 jdl last cpe unk ok 12/17/24 jdl Medicine refill

## 2025-01-23 DIAGNOSIS — I21.4 NSTEMI (NON-ST ELEVATED MYOCARDIAL INFARCTION) (HCC): ICD-10-CM

## 2025-01-23 DIAGNOSIS — E78.00 HYPERCHOLESTEREMIA: ICD-10-CM

## 2025-01-26 NOTE — TELEPHONE ENCOUNTER
Refill passed per [facility] protocol.    Requested Prescriptions   Pending Prescriptions Disp Refills    metFORMIN HCl 1000 MG Oral Tab 180 tablet 0     Sig: Take 1 tablet (1,000 mg total) by mouth 2 (two) times daily with meals.       Diabetes Medication Protocol Passed - 1/26/2025  9:25 AM        Passed - Last A1C < 7.5 and within past 6 months     Lab Results   Component Value Date    A1C 7.2 (H) 11/27/2024             Passed - In person appointment or virtual visit in the past 6 mos or appointment in next 3 mos     Recent Outpatient Visits              1 month ago Eczema, unspecified type    [address] Linda Gipson APRN    Office Visit    5 months ago Migraine without aura and with status migrainosus, not intractable    [facility] Ben Jacobo MD    Office Visit    6 months ago Ear pressure, left    [address] Tanmay Nowak MD    Office Visit    9 months ago Migraine without aura and with status migrainosus, not intractable    [facility] Stormy Rodriguez PA    Office Visit    11 months ago Adhesive capsulitis of left shoulder    [address] Chriss Esposito MD    Office Visit          Future Appointments         Provider Department Appt Notes    In 1 month Tanmay Nowak MD [address] lvm/mcm for sooner time 12/17/24 jdl last cpe unk ok 12/17/24 jdl Medicine refill                    Passed - Microalbumin procedure in past 12 months or taking ACE/ARB        Passed - EGFRCR or GFRNAA > 50     GFR Evaluation  EGFRCR: 99 , resulted on 11/27/2024          Passed - GFR in the past 12 months        Passed - Medication is active on med list             Future Appointments         Provider Department Appt Notes    In 1 month Tanmay Nowak MD  [address] lvm/mcm for sooner time 12/17/24 jdl last cpe unk ok 12/17/24 jdl Medicine refill            Recent Outpatient Visits              1 month ago Eczema, unspecified type    [address] Linda Gipson APRN    Office Visit    5 months ago Migraine without aura and with status migrainosus, not intractable    [facility] Ben Jacobo MD    Office Visit    6 months ago Ear pressure, left    [address]    Office Visit    9 months ago Migraine without aura and with status migrainosus, not intractable    [facility] Stormy Rodriguez PA    Office Visit    11 months ago Adhesive capsulitis of left shoulder    [address] Chriss Esposito MD    Office Visit

## 2025-02-09 DIAGNOSIS — G47.00 INSOMNIA, UNSPECIFIED TYPE: ICD-10-CM

## 2025-02-13 RX ORDER — ZOLPIDEM TARTRATE 5 MG/1
5 TABLET ORAL NIGHTLY PRN
Qty: 30 TABLET | Refills: 0 | Status: SHIPPED | OUTPATIENT
Start: 2025-02-13

## 2025-02-13 NOTE — TELEPHONE ENCOUNTER
Medication(s) to Refill:   Requested Prescriptions     Pending Prescriptions Disp Refills    zolpidem (AMBIEN) 5 MG Oral Tab 30 tablet 0     Sig: Take 1 tablet (5 mg total) by mouth nightly as needed for Sleep.         Reason for Medication Refill being sent to Provider / Reason Protocol Failed:  [x] Controlled Substance       Quantity: 30  Last Time Medication was Filled:  12/04/2024  Last Time Medication was Written : 12/04/2024      Last Office Visit : 12/20/2024

## 2025-02-28 ENCOUNTER — LAB ENCOUNTER (OUTPATIENT)
Dept: LAB | Facility: HOSPITAL | Age: 60
End: 2025-02-28
Attending: INTERNAL MEDICINE
Payer: COMMERCIAL

## 2025-02-28 DIAGNOSIS — Z13.29 THYROID DISORDER SCREENING: ICD-10-CM

## 2025-02-28 DIAGNOSIS — E78.00 PURE HYPERCHOLESTEROLEMIA: ICD-10-CM

## 2025-02-28 DIAGNOSIS — Z00.00 ROUTINE GENERAL MEDICAL EXAMINATION AT A HEALTH CARE FACILITY: ICD-10-CM

## 2025-02-28 DIAGNOSIS — E78.00 HYPERCHOLESTEREMIA: ICD-10-CM

## 2025-02-28 DIAGNOSIS — Z13.0 SCREENING FOR BLOOD DISEASE: ICD-10-CM

## 2025-02-28 DIAGNOSIS — Z12.5 SPECIAL SCREENING FOR MALIGNANT NEOPLASM OF PROSTATE: ICD-10-CM

## 2025-02-28 DIAGNOSIS — E11.9 TYPE 2 DIABETES MELLITUS WITHOUT COMPLICATION, WITHOUT LONG-TERM CURRENT USE OF INSULIN (HCC): ICD-10-CM

## 2025-02-28 LAB
ALBUMIN SERPL-MCNC: 5.1 G/DL (ref 3.2–4.8)
ALBUMIN/GLOB SERPL: 1.8 {RATIO} (ref 1–2)
ALP LIVER SERPL-CCNC: 92 U/L
ALT SERPL-CCNC: 38 U/L
ANION GAP SERPL CALC-SCNC: 13 MMOL/L (ref 0–18)
AST SERPL-CCNC: 90 U/L (ref ?–34)
BASOPHILS # BLD AUTO: 0.01 X10(3) UL (ref 0–0.2)
BASOPHILS NFR BLD AUTO: 0.3 %
BILIRUB SERPL-MCNC: 0.4 MG/DL (ref 0.2–1.1)
BUN BLD-MCNC: 18 MG/DL (ref 9–23)
CALCIUM BLD-MCNC: 10.1 MG/DL (ref 8.7–10.6)
CHLORIDE SERPL-SCNC: 102 MMOL/L (ref 98–112)
CHOLEST SERPL-MCNC: 159 MG/DL (ref ?–200)
CO2 SERPL-SCNC: 26 MMOL/L (ref 21–32)
COMPLEXED PSA SERPL-MCNC: 0.25 NG/ML (ref ?–4)
CREAT BLD-MCNC: 1.06 MG/DL
CREAT UR-SCNC: 101.6 MG/DL
EGFRCR SERPLBLD CKD-EPI 2021: 80 ML/MIN/1.73M2 (ref 60–?)
EOSINOPHIL # BLD AUTO: 0.13 X10(3) UL (ref 0–0.7)
EOSINOPHIL NFR BLD AUTO: 3.8 %
ERYTHROCYTE [DISTWIDTH] IN BLOOD BY AUTOMATED COUNT: 14.7 %
EST. AVERAGE GLUCOSE BLD GHB EST-MCNC: 154 MG/DL (ref 68–126)
FASTING PATIENT LIPID ANSWER: YES
FASTING STATUS PATIENT QL REPORTED: YES
GLOBULIN PLAS-MCNC: 2.8 G/DL (ref 2–3.5)
GLUCOSE BLD-MCNC: 88 MG/DL (ref 70–99)
HBA1C MFR BLD: 7 % (ref ?–5.7)
HCT VFR BLD AUTO: 43.5 %
HDLC SERPL-MCNC: 33 MG/DL (ref 40–59)
HGB BLD-MCNC: 14.3 G/DL
IMM GRANULOCYTES # BLD AUTO: 0.03 X10(3) UL (ref 0–1)
IMM GRANULOCYTES NFR BLD: 0.9 %
LDLC SERPL CALC-MCNC: 95 MG/DL (ref ?–100)
LYMPHOCYTES # BLD AUTO: 0.94 X10(3) UL (ref 1–4)
LYMPHOCYTES NFR BLD AUTO: 27.3 %
MCH RBC QN AUTO: 27.8 PG (ref 26–34)
MCHC RBC AUTO-ENTMCNC: 32.9 G/DL (ref 31–37)
MCV RBC AUTO: 84.6 FL
MICROALBUMIN UR-MCNC: 0.7 MG/DL
MICROALBUMIN/CREAT 24H UR-RTO: 6.9 UG/MG (ref ?–30)
MONOCYTES # BLD AUTO: 0.58 X10(3) UL (ref 0.1–1)
MONOCYTES NFR BLD AUTO: 16.9 %
NEUTROPHILS # BLD AUTO: 1.75 X10 (3) UL (ref 1.5–7.7)
NEUTROPHILS # BLD AUTO: 1.75 X10(3) UL (ref 1.5–7.7)
NEUTROPHILS NFR BLD AUTO: 50.8 %
NONHDLC SERPL-MCNC: 126 MG/DL (ref ?–130)
OSMOLALITY SERPL CALC.SUM OF ELEC: 293 MOSM/KG (ref 275–295)
PLATELET # BLD AUTO: 146 10(3)UL (ref 150–450)
POTASSIUM SERPL-SCNC: 4.6 MMOL/L (ref 3.5–5.1)
PROT SERPL-MCNC: 7.9 G/DL (ref 5.7–8.2)
RBC # BLD AUTO: 5.14 X10(6)UL
SODIUM SERPL-SCNC: 141 MMOL/L (ref 136–145)
TRIGL SERPL-MCNC: 179 MG/DL (ref 30–149)
TSI SER-ACNC: 3.63 UIU/ML (ref 0.55–4.78)
VLDLC SERPL CALC-MCNC: 29 MG/DL (ref 0–30)
WBC # BLD AUTO: 3.4 X10(3) UL (ref 4–11)

## 2025-02-28 PROCEDURE — 80053 COMPREHEN METABOLIC PANEL: CPT

## 2025-02-28 PROCEDURE — 83036 HEMOGLOBIN GLYCOSYLATED A1C: CPT

## 2025-02-28 PROCEDURE — 84443 ASSAY THYROID STIM HORMONE: CPT

## 2025-02-28 PROCEDURE — 82570 ASSAY OF URINE CREATININE: CPT

## 2025-02-28 PROCEDURE — 85025 COMPLETE CBC W/AUTO DIFF WBC: CPT

## 2025-02-28 PROCEDURE — 36415 COLL VENOUS BLD VENIPUNCTURE: CPT

## 2025-02-28 PROCEDURE — 80061 LIPID PANEL: CPT

## 2025-02-28 PROCEDURE — 82043 UR ALBUMIN QUANTITATIVE: CPT

## 2025-04-07 DIAGNOSIS — G47.00 INSOMNIA, UNSPECIFIED TYPE: ICD-10-CM

## 2025-04-09 NOTE — ANESTHESIA POSTPROCEDURE EVALUATION
Caller: Paula Trevino    Relationship: Self        What was the call regarding: [employer] REQUEST DOCUMENT FROM 2/27/25.         [facility] Grant Bang Patient Status:  Hospital Outpatient Surgery   Age/Gender 62year old male MRN [id number]   Location [facility] Attending Delgado Morrow MD   Hosp Day # 0 PCP Henrietta Garza MD       Anesth

## 2025-04-10 RX ORDER — OMEPRAZOLE 40 MG/1
40 CAPSULE, DELAYED RELEASE ORAL DAILY
Qty: 90 CAPSULE | Refills: 3 | OUTPATIENT
Start: 2025-04-10 | End: 2026-04-05

## 2025-04-10 RX ORDER — ZOLPIDEM TARTRATE 5 MG/1
5 TABLET ORAL NIGHTLY PRN
Qty: 30 TABLET | Refills: 0 | Status: SHIPPED | OUTPATIENT
Start: 2025-04-10

## 2025-04-10 NOTE — TELEPHONE ENCOUNTER
Please review; protocol failed/No Protocol      Recent Fills: 12/04/2024, 02/13/2025    Last Rx Written: 02/13/2025    Last Office Visit: 12/20/2024  Future Appointments   Date Time Provider Department Center   6/19/2025 10:00 AM Tanmay Nowak MD [facility]

## 2025-05-27 ENCOUNTER — OFFICE VISIT (OUTPATIENT)
Dept: INTERNAL MEDICINE CLINIC | Facility: CLINIC | Age: 60
End: 2025-05-27
Payer: COMMERCIAL

## 2025-05-27 VITALS
HEART RATE: 72 BPM | WEIGHT: 176 LBS | BODY MASS INDEX: 27.62 KG/M2 | SYSTOLIC BLOOD PRESSURE: 126 MMHG | HEIGHT: 67 IN | OXYGEN SATURATION: 98 % | DIASTOLIC BLOOD PRESSURE: 70 MMHG | RESPIRATION RATE: 17 BRPM | TEMPERATURE: 97 F

## 2025-05-27 DIAGNOSIS — E78.00 PURE HYPERCHOLESTEROLEMIA: ICD-10-CM

## 2025-05-27 DIAGNOSIS — Z86.73 HISTORY OF CVA (CEREBROVASCULAR ACCIDENT): ICD-10-CM

## 2025-05-27 DIAGNOSIS — Z00.00 ROUTINE GENERAL MEDICAL EXAMINATION AT A HEALTH CARE FACILITY: Primary | ICD-10-CM

## 2025-05-27 DIAGNOSIS — L30.9 ECZEMA, UNSPECIFIED TYPE: ICD-10-CM

## 2025-05-27 DIAGNOSIS — I10 ESSENTIAL HYPERTENSION: ICD-10-CM

## 2025-05-27 DIAGNOSIS — M51.26 HNP (HERNIATED NUCLEUS PULPOSUS), LUMBAR: ICD-10-CM

## 2025-05-27 DIAGNOSIS — I65.09 VERTEBRAL ARTERY STENOSIS, UNSPECIFIED LATERALITY: ICD-10-CM

## 2025-05-27 DIAGNOSIS — I25.10 CORONARY ARTERY DISEASE INVOLVING NATIVE CORONARY ARTERY OF NATIVE HEART WITHOUT ANGINA PECTORIS: ICD-10-CM

## 2025-05-27 DIAGNOSIS — E11.9 TYPE 2 DIABETES MELLITUS WITHOUT COMPLICATION, WITHOUT LONG-TERM CURRENT USE OF INSULIN (HCC): ICD-10-CM

## 2025-05-27 PROCEDURE — 3061F NEG MICROALBUMINURIA REV: CPT | Performed by: INTERNAL MEDICINE

## 2025-05-27 PROCEDURE — 99396 PREV VISIT EST AGE 40-64: CPT | Performed by: INTERNAL MEDICINE

## 2025-05-27 PROCEDURE — 3074F SYST BP LT 130 MM HG: CPT | Performed by: INTERNAL MEDICINE

## 2025-05-27 PROCEDURE — 3008F BODY MASS INDEX DOCD: CPT | Performed by: INTERNAL MEDICINE

## 2025-05-27 PROCEDURE — 3051F HG A1C>EQUAL 7.0%<8.0%: CPT | Performed by: INTERNAL MEDICINE

## 2025-05-27 PROCEDURE — 3078F DIAST BP <80 MM HG: CPT | Performed by: INTERNAL MEDICINE

## 2025-05-27 RX ORDER — EFINACONAZOLE 100 MG/ML
1 SOLUTION TOPICAL DAILY
COMMUNITY
Start: 2025-02-25

## 2025-05-27 RX ORDER — BENAZEPRIL HYDROCHLORIDE 20 MG/1
20 TABLET ORAL 2 TIMES DAILY
COMMUNITY
Start: 2025-05-01

## 2025-05-27 NOTE — PROGRESS NOTES
Javad Bang  4/8/1965    Chief Complaint   Patient presents with    Physical     Rm-7,aw, awv Reviewed Preventative/Wellness form with patient.        HPI:   Javad Bang is a 60 year old male who presents for an annual physical examination.    History of Present Illness    Since last evaluation the patient has maintained favorable lifestyle habits, undergoing his exercise regimen at least 4 days weekly, and has made significant improvements with dietary habits.  He has reduced consumption of carbohydrates, significantly.  Hemoglobin A1c in February was 7.0%.  No microalbuminuria.  AST is 90.  Triglyceride level is 179 and LDL cholesterol is 95.    Current Medications[1]   Allergies[2]   Past Medical History[3]   Problem List[4]   Past Surgical History[5]   Family History[6]   Short Social Hx on File[7]      REVIEW OF SYSTEMS:   GENERAL: feels well otherwise  SKIN: no rashes  EYES:denies blurred vision or double vision  HEENT: not congested  LUNGS: denies shortness of breath with exertion  CARDIOVASCULAR: denies chest pain on exertion  GI: no nausea or abdominal pain  NEURO: denies headaches    EXAM:   /70 (BP Location: Right arm, Patient Position: Sitting, Cuff Size: adult)   Pulse 72   Temp 97 °F (36.1 °C) (Temporal)   Resp 17   Ht 5' 7\" (1.702 m)   Wt 176 lb (79.8 kg)   SpO2 98%   BMI 27.57 kg/m²   GENERAL: Well developed, well nourished,in no apparent distress  SKIN: No rashes,no suspicious lesions  EYES: bilateral conjunctiva are clear  HEENT: atraumatic, normocephalic. TM WNL BL.  NECK: supple,no adenopathy,no bruits  LUNGS: clear to auscultation  CARDIO: RRR without murmur  GI: good BS's,no masses, HSM or tenderness  Bilateral barefoot skin diabetic exam is normal, visualized feet and the appearance is normal.  Bilateral monofilament/sensation of both feet is normal.  Pulsation pedal pulse exam of both lower legs/feet is normal as well.  ASSESSMENT AND PLAN:   Javad Bang is a 60 year old  male who presents for an annual physical examination.    Assessment & Plan    Outstanding screening and preventive measures:  None    Diabetes mellitus type 2:  Controlled with hemoglobin A1c of 7.0%; suspected further improvement following recent dietary changes.  Continue metformin 1 g twice daily and Jardiance 25 mg daily.  Pure hypercholesterolemia: LDL at goal: Continue atorvastatine 40 mg daily  No microalbuminuria: Continue benazepril 20 mg daily  Ophthalmologic evaluation completed today    Benign essential hypertension:  Stable and controlled  Continue benazepril 20 mg daily and metoprolol titrate 12.5 mg twice daily  Continue favorable dietary and lifestyle habits    CAD with stent x3:  Stable and without angina  Continue regular follow-up with cardiology service  Continue current beta-blocker, dual antiplatelet, statin, and Jardiance therapies     Herniated nucleus pulposis:  Stable   No focal neurological deficits     History of CVA of right carotid artery  No focal neurological deficits  Following with neurology service  Continue current medical management, including dual antiplatelet therapy and atorvastatin 40 mg daily and Jardiance 25 mg daily    Eczema:  Stable with topical steroid therapy as needed    The patient indicates understanding of these issues and agrees to the plan.    TODAY'S ORDERS     Orders Placed This Encounter   Procedures    Diabetic Retinopathy Exam  OU - Both Eyes       Meds & Refills:  Requested Prescriptions      No prescriptions requested or ordered in this encounter       Imaging & Consults:  DME DIABETIC TEST STRIPS AND SUPPLIES    No follow-ups on file.  There are no Patient Instructions on file for this visit.    All questions were answered and the patient agrees with the plan.     Thank you,  Tanmay Nowak MD       [1]   Current Outpatient Medications   Medication Sig Dispense Refill    Benazepril HCl 20 MG Oral Tab Take 1 tablet (20 mg total) by mouth 2 (two) times daily.       Efinaconazole (JUBLIA) 10 % External Solution Apply 1 Application topically daily.      zolpidem (AMBIEN) 5 MG Oral Tab Take 1 tablet (5 mg total) by mouth nightly as needed for Sleep. 30 tablet 0    metFORMIN HCl 1000 MG Oral Tab Take 1 tablet (1,000 mg total) by mouth 2 (two) times daily with meals. 180 tablet 3    glipiZIDE ER 2.5 MG Oral Tablet 24 Hr Take 1 tablet (2.5 mg total) by mouth daily with breakfast. 90 tablet 3    triamcinolone 0.1 % External Cream Apply topically 2 (two) times daily as needed. 15 g 0    Prednicarbate 0.1 % External Ointment Apply 1 Application topically nightly as needed. 15 g 0    metoprolol tartrate 25 MG Oral Tab Take 0.5 tablets (12.5 mg total) by mouth 2 (two) times daily. 90 tablet 1    empagliflozin (JARDIANCE) 25 MG Oral Tab Take 1 tablet (25 mg total) by mouth daily. 90 tablet 3    Omeprazole 40 MG Oral Capsule Delayed Release Take 1 capsule (40 mg total) by mouth daily. 90 capsule 3    atorvastatin 40 MG Oral Tab Take 1 tablet (40 mg total) by mouth nightly. 90 tablet 0    clopidogrel 75 MG Oral Tab Take 1 tablet (75 mg total) by mouth daily. 90 tablet 3    Multiple Vitamins-Minerals (CENTRUM ADULTS OR) Take by mouth daily.      aspirin 81 MG Oral Tab Take 1 tablet (81 mg total) by mouth daily.     [2] No Known Allergies  [3]   Past Medical History:   Back problem    DIABETES    Diabetes (HCC)    Diverticulitis    Diverticulosis of large intestine    Esophageal reflux    High blood pressure    High cholesterol    HYPERLIPIDEMIA    KIDNEY STONE    Muscle weakness    left leg weakness    SLEEP APNEA    dx 6 months ago but patient doubts his dx    Swine flu    Visual impairment    glasses   [4]   Patient Active Problem List  Diagnosis    Type 2 diabetes mellitus without complication, without long-term current use of insulin (HCC)    Coronary artery disease involving native coronary artery of native heart without angina pectoris    Pure hypercholesterolemia    Essential  hypertension    HNP (herniated nucleus pulposus), lumbar    History of CVA (cerebrovascular accident)   [5]   Past Surgical History:  Procedure Laterality Date    Angioplasty (coronary)  10/17/16    stent circumflex    Appendectomy      Colonoscopy  16= Diverticulosis    Repeat     Colonoscopy,diagnostic N/A 2016    Procedure: COLONOSCOPY, POSSIBLE BIOPSY, POSSIBLE POLYPECTOMY 15553;  Surgeon: Noel Hamilton MD;  Location: [facility]    Cystoscopy,insert ureteral stent      remove kidney stone    Other surgical history      kidney stone removed    Tonsillectomy     [6] History reviewed. No pertinent family history.  [7]   Social History  Socioeconomic History    Marital status:    Tobacco Use    Smoking status: Former     Current packs/day: 0.00     Types: Cigarettes     Quit date: 1993     Years since quittin.1     Passive exposure: Past    Smokeless tobacco: Never   Vaping Use    Vaping status: Former   Substance and Sexual Activity    Alcohol use: Yes     Alcohol/week: 2.0 standard drinks of alcohol     Types: 2 Glasses of wine per week     Comment: socially cage 25    Drug use: Never   Other Topics Concern    Caffeine Concern Yes     Comment: 1 cup a day    Stress Concern No    Weight Concern No    Special Diet No    Exercise Yes     Comment: 3 x week    Seat Belt Yes     Social Drivers of Health     Food Insecurity: No Food Insecurity (2025)    NCSS - Food Insecurity     Worried About Running Out of Food in the Last Year: No     Ran Out of Food in the Last Year: No   Transportation Needs: No Transportation Needs (2025)    NCSS - Transportation     Lack of Transportation: No   Housing Stability: Not At Risk (2025)    NCSS - Housing/Utilities     Has Housing: Yes     Worried About Losing Housing: No     Unable to Get Utilities: No

## 2025-06-12 ENCOUNTER — PATIENT MESSAGE (OUTPATIENT)
Dept: INTERNAL MEDICINE CLINIC | Facility: CLINIC | Age: 60
End: 2025-06-12

## 2025-06-12 DIAGNOSIS — E11.9 TYPE 2 DIABETES MELLITUS WITHOUT COMPLICATION, WITHOUT LONG-TERM CURRENT USE OF INSULIN (HCC): Primary | ICD-10-CM

## 2025-06-21 DIAGNOSIS — I25.10 ATHEROSCLEROTIC HEART DISEASE OF NATIVE CORONARY ARTERY WITHOUT ANGINA PECTORIS: ICD-10-CM

## 2025-06-24 RX ORDER — METOPROLOL TARTRATE 25 MG/1
12.5 TABLET, FILM COATED ORAL 2 TIMES DAILY
Qty: 90 TABLET | Refills: 3 | Status: SHIPPED | OUTPATIENT
Start: 2025-06-24

## 2025-07-09 ENCOUNTER — PATIENT MESSAGE (OUTPATIENT)
Dept: INTERNAL MEDICINE CLINIC | Facility: CLINIC | Age: 60
End: 2025-07-09

## 2025-07-09 NOTE — TELEPHONE ENCOUNTER
Triage support - please follow up on Dr. Nowak's order/DME    Order Questions    Question Answer   Notify staff to enter order in [place]? No   Frequency of testing 2 times daily   Dispensable supplies: Blood sugar test strips    Lancet devices and lancets    Blood sugar monitoring device/kit    Glucose control solutions for checking the accuracy of testing equipment and test strips   Quantity of test strips: (per month or 3 month) 200            Associated Diagnoses    Type 2 diabetes mellitus without complication, without long-term current use of insulin ([facility])  - Primary          ORDER REQUISITION    Open Hard Copy Result Report (Order #[id number] - Diabetic Test Strips and Supplies)       Order Information    Date Department Ordering/Authorizing   6/15/2025 [address] Tanmay Nowak MD       Order Providers    Authorizing Encounter   Tanmay Nowak [employer]                Encounter Status    Electronically signed by Tanmay Nowak MD on 6/15/25 at 5:15 PM                                  Additional Information    Associated Reports   View Encounter   Priority and Order Details             Patient Release Status:    This result is not viewable by the patient.                    Additional Details    View Order Tracking       Questions    Notify staff to enter order in [place]? No   Frequency of testing 2 times daily   Dispensable supplies: Blood sugar test strips  Lancet devices and lancets  Blood sugar monitoring device/kit  Glucose control solutions for checking the accuracy of testing equipment and test strips   Quantity of test strips: (per month or 3 month) 200

## 2025-08-01 ENCOUNTER — PATIENT MESSAGE (OUTPATIENT)
Dept: INTERNAL MEDICINE CLINIC | Facility: CLINIC | Age: 60
End: 2025-08-01

## (undated) DEVICE — Device

## (undated) DEVICE — SUTURE MONOCRYL 3-0 PS-2

## (undated) DEVICE — STERILE POLYISOPRENE POWDER-FREE SURGICAL GLOVES: Brand: PROTEXIS

## (undated) DEVICE — LIGHT HANDLE

## (undated) DEVICE — CAUTERY BLADE 2IN INS E1455

## (undated) DEVICE — PROXIMATE RH ROTATING HEAD SKIN STAPLERS (35 WIDE) CONTAINS 35 STAINLESS STEEL STAPLES: Brand: PROXIMATE

## (undated) DEVICE — FLOSEAL HEMOSTATIC MATRIX, 5ML: Brand: FLOSEAL HEMOSTATIC MATRIX

## (undated) DEVICE — DRAPE MICROSCOPE LEICA

## (undated) DEVICE — GOWN SURG AERO CHROME XXL

## (undated) DEVICE — KENDALL SCD EXPRESS SLEEVES, KNEE LENGTH, MEDIUM: Brand: KENDALL SCD

## (undated) DEVICE — SUTURE VICRYL 0 UR-6

## (undated) DEVICE — DERMABOND LIQUID ADHESIVE

## (undated) DEVICE — DRAIN SILICONE RND 1/8

## (undated) DEVICE — SUTURE VICRYL 2-0 CP-2

## (undated) DEVICE — CHLORAPREP 26ML APPLICATOR

## (undated) DEVICE — 3M™ IOBAN™ 2 ANTIMICROBIAL INCISE DRAPE 6650EZ: Brand: IOBAN™ 2

## (undated) DEVICE — 3.0MM PRECISION NEURO (MATCH HEAD)

## (undated) DEVICE — DRAPE C-ARM UNIVERSAL

## (undated) DEVICE — BLADE ELECTROSURG 4IN INSULATE

## (undated) DEVICE — LAMINECTOMY CDS: Brand: MEDLINE INDUSTRIES, INC.

## (undated) DEVICE — SOL  .9 1000ML BTL

## (undated) DEVICE — DRAIN RELIAVAC 100CC

## (undated) DEVICE — TRANSPOSAL ULTRAFLEX DUO/QUAD ULTRA CART MANIFOLD

## (undated) NOTE — LETTER
[facility] [address], [address] [place] [address]    Consent for Anesthesia   1.    Francisco Javier Esposito agree to be cared for by a physician anesthesiologist alone and/or with a nurse anesthetist, who is specially trained to monitor me and give me m allergic reactions to medications, injury to my airway, heart, lungs, vision, nerves, or muscles and in extremely rare instances death. 5. My doctor has explained to me other choices available to me for my care (alternatives).   6. Pregnant Patients (“epid Printed: 1/7/2020 at 1:06 PM    Medical Record #: [id number]                                            Page 1 of 1

## (undated) NOTE — ED AVS SNAPSHOT
Torrie Hicks   MRN: [id number]    Department:  [facility] Emergency Department   Date of Visit:  10/30/2017           Disclosure     Insurance plans vary and the physician(s) referred by the ER may not be covered by your plan.  Please contact you If you have been prescribed any medication(s), please fill your prescription right away and begin taking the medication(s) as directed    If the emergency physician has read X-rays, these will be re-interpreted by a radiologist.  If there is a significant

## (undated) NOTE — LETTER
Eri Hoskins [address]  [place], [address] [facility]    Consent for Operation  Date: __________________                                Time: _______________    1. I authorize the performance upon Xu Guzmán the following operation:    2.  Proce procedure has been videotaped, the surgeon will obtain the original videotape. The hospital will not be responsible for storage or maintenance of this tape.   8. For the purpose of advancing medical education, I consent to the admittance of observers to the STATEMENTS REQUIRING INSERTION OR COMPLETION WERE FILLED IN.     Signature of Patient:   ___________________________    When the patient is a minor or mentally incompetent to give consent:  Signature of person authorized to consent for patient: ____________ supplements, and pills I can buy without a prescription (including street drugs/illegal medications). Failure to inform my anesthesiologist about these medicines may increase my risk of anesthetic complications. iv.  If I am allergic to anything or have ha Anesthesiologist Signature     Date   Time  I have discussed the procedure and information above with the patient (or patient’s representative) and answered their questions. The patient or their representative has agreed to have anesthesia services.     ___

## (undated) NOTE — LETTER
[address], [address] [facility]    Consent for Anesthesia   1.    Emmanuel Basilio agree to be cared for by a physician anesthesiologist alone and/or with a nurse anesthetist, who is specially trained to monitor me and give me m allergic reactions to medications, injury to my airway, heart, lungs, vision, nerves, or muscles and in extremely rare instances death. 5. My doctor has explained to me other choices available to me for my care (alternatives).   6. Pregnant Patients (“epid Printed: 1/7/2020 at 1:06 PM    Medical Record #: [id number]                                            Page 1 of 1

## (undated) NOTE — ED AVS SNAPSHOT
[facility] Emergency Department    [place]  [address] [address]    Phone:  [phone]    Fax:  [address]   MRN: [id number]    Department:  [facility] Emergency Department   Date of Visit:  5/19/ To Check ER Wait Times:  TEXT 'ERwait' to 38138      Click [email or web address]      Or call [phone]    If you have any problems with your follow-up, please call our  at [phone]    Si usted tiene algun problema con cherry sequimiento, por f I have read and understand the instructions given to me by my caregivers. 24[facility]Hour Pharmacies        Pharmacy Address Phone Number   [address] ([address]) Brock Bañuelos Dictated by: Emanuel Saeed MD on 5/19/2017 at 15:14       Approved by: Emanuel Saeed MD              Narrative:    PROCEDURE:  XR CHEST PA + LAT CHEST (CPT=71020)     INDICATIONS:  chest pain     COMPARISON:  [facility] , XR CHEST PA + LAT CHEST (CPT=710

## (undated) NOTE — LETTER
[facility]  [address]  [facility] [address] [phone]         Dear Mr Bang     I received your message and relayed that you can increase the Nortriptyline from 10 mg to double that 20 mg (2 cap) until you ran out of the 10 mg after which we are going to use 25 mg     Meanwhile with your history of falling and balance, we need to repeat your MRI of BRAIN.  I have sent a prescription of an MRI through [employer] [facility] in [place] which is an affiliate of [facility].       Please call us when your can to discuss the matter     Dr. LUCIANA Jacobo

## (undated) NOTE — ED AVS SNAPSHOT
[facility] Emergency Department    [place]  [address] [address]    Phone:  [phone]    Fax:  [address]   MRN: [id number]    Department:  [facility] Emergency Department   Date of Visit:  5/19/ IF THERE IS ANY CHANGE OR WORSENING OF YOUR CONDITION, CALL YOUR PRIMARY CARE PHYSICIAN AT ONCE OR RETURN IMMEDIATELY TO THE EMERGENCY DEPARTMENT.     If you have been prescribed any medication(s), please fill your prescription right away and begin taking t